# Patient Record
Sex: FEMALE | Race: WHITE | NOT HISPANIC OR LATINO | Employment: UNEMPLOYED | URBAN - METROPOLITAN AREA
[De-identification: names, ages, dates, MRNs, and addresses within clinical notes are randomized per-mention and may not be internally consistent; named-entity substitution may affect disease eponyms.]

---

## 2021-02-25 ENCOUNTER — IMMUNIZATION (OUTPATIENT)
Dept: VACCINE CLINIC | Facility: HOSPITAL | Age: 26
End: 2021-02-25

## 2021-02-25 PROCEDURE — 0011A: CPT | Performed by: INTERNAL MEDICINE

## 2021-02-25 PROCEDURE — 91301 HC SARSCO02 VAC 100MCG/0.5ML IM: CPT | Performed by: INTERNAL MEDICINE

## 2021-03-25 ENCOUNTER — IMMUNIZATION (OUTPATIENT)
Dept: VACCINE CLINIC | Facility: HOSPITAL | Age: 26
End: 2021-03-25

## 2021-03-25 PROCEDURE — 91301 HC SARSCO02 VAC 100MCG/0.5ML IM: CPT | Performed by: INTERNAL MEDICINE

## 2021-03-25 PROCEDURE — 0012A: CPT | Performed by: INTERNAL MEDICINE

## 2022-01-31 ENCOUNTER — TELEPHONE (OUTPATIENT)
Dept: OBSTETRICS AND GYNECOLOGY | Facility: CLINIC | Age: 27
End: 2022-01-31

## 2022-01-31 NOTE — TELEPHONE ENCOUNTER
PT called to schedule a New OB APPT, LMP 12/24, APPT scheduled for 2/22. PT was wondering if she would / should come in sooner, pt has been experiencing cramping. Please advise.

## 2022-01-31 NOTE — TELEPHONE ENCOUNTER
S/w pt she states her NOB appt is on 2/22 with Dr. Landin and complains of having cramping that worsens at night and lasts about 5-10 at a time. Patient denies vaginal bleeding, severe pelvic pain/cramping. Patient states she has had recent intercourse, so I advised patient to pelvic rest, increase water intake, monitor s/s and if her s/s continue or worsen or if she starts to have vaginal bleeding to CB for further evaluaton or go to ED. She v/u

## 2022-02-10 ENCOUNTER — TELEPHONE (OUTPATIENT)
Dept: OBSTETRICS AND GYNECOLOGY | Facility: CLINIC | Age: 27
End: 2022-02-10

## 2022-02-10 DIAGNOSIS — Z32.00 POSSIBLE PREGNANCY, NOT CONFIRMED: Primary | ICD-10-CM

## 2022-02-10 NOTE — TELEPHONE ENCOUNTER
I spoke with pt and she states she has a + pregnancy test and is scheduled 02/22/2022 with Dr. Landin. She reports she has only had mild pregnancy symptoms like frequent urination and mild cramping soon after she found out she was pregnant. She states she is just concerned because now she is not having any symptoms. I told her she is welcome to come in and have an HCG drawn to see how far along she is and a repeat to make sure it is rising appropriately to make sure baby is okay. She vu

## 2022-02-11 ENCOUNTER — LAB (OUTPATIENT)
Dept: OBSTETRICS AND GYNECOLOGY | Facility: CLINIC | Age: 27
End: 2022-02-11

## 2022-02-11 LAB — HCG INTACT+B SERPL-ACNC: NORMAL MIU/ML

## 2022-02-14 ENCOUNTER — TELEPHONE (OUTPATIENT)
Dept: OBSTETRICS AND GYNECOLOGY | Facility: CLINIC | Age: 27
End: 2022-02-14

## 2022-02-14 ENCOUNTER — LAB (OUTPATIENT)
Dept: OBSTETRICS AND GYNECOLOGY | Facility: CLINIC | Age: 27
End: 2022-02-14

## 2022-02-14 DIAGNOSIS — Z32.00 POSSIBLE PREGNANCY, NOT CONFIRMED: Primary | ICD-10-CM

## 2022-02-14 NOTE — TELEPHONE ENCOUNTER
S/w pt she states she was advised to come in today for repeat HCG lab to see if her levels have increased.     HCG lab order has been placed, pt. States she is coming in today for labs.

## 2022-02-15 LAB — HCG INTACT+B SERPL-ACNC: NORMAL MIU/ML

## 2022-02-22 ENCOUNTER — INITIAL PRENATAL (OUTPATIENT)
Dept: OBSTETRICS AND GYNECOLOGY | Facility: CLINIC | Age: 27
End: 2022-02-22

## 2022-02-22 VITALS — SYSTOLIC BLOOD PRESSURE: 116 MMHG | WEIGHT: 123 LBS | DIASTOLIC BLOOD PRESSURE: 76 MMHG

## 2022-02-22 DIAGNOSIS — O20.0 THREATENED ABORTION: ICD-10-CM

## 2022-02-22 DIAGNOSIS — O41.8X10 SUBCHORIONIC HEMORRHAGE OF PLACENTA IN FIRST TRIMESTER, SINGLE OR UNSPECIFIED FETUS: ICD-10-CM

## 2022-02-22 DIAGNOSIS — O46.8X1 SUBCHORIONIC HEMORRHAGE OF PLACENTA IN FIRST TRIMESTER, SINGLE OR UNSPECIFIED FETUS: ICD-10-CM

## 2022-02-22 DIAGNOSIS — Z3A.01 LESS THAN 8 WEEKS GESTATION OF PREGNANCY: Primary | ICD-10-CM

## 2022-02-22 DIAGNOSIS — Z11.3 SCREENING EXAMINATION FOR STD (SEXUALLY TRANSMITTED DISEASE): ICD-10-CM

## 2022-02-22 PROCEDURE — 99204 OFFICE O/P NEW MOD 45 MIN: CPT | Performed by: OBSTETRICS & GYNECOLOGY

## 2022-02-22 NOTE — PROGRESS NOTES
Initial ob visit     CC- Here for care of pregnancy        Nika Lin is a 26 y.o. female, , who presents for her first obstetrical visit.  Her last LMP was Patient's last menstrual period was 2021..    # 1 - Date: None, Sex: None, Weight: None, GA: None, Delivery: None, Apgar1: None, Apgar5: None, Living: None, Birth Comments: None      Current obstetric complaints : none  Initial positive test date : 22     Location : home UPT    Prior obstetric issues, None  Potential pregnancy concerns: none  Family history of genetic issues (includes FOB):yes - connective tissue disease  Prior infections concerning in pregnancy (Rash, fever in last 2 weeks): no  Varicella Hx -Yes  Prior testing for Cystic Fibrosis Carrier or Sickle Cell Trait- No  Prepregnancy BMI - There is no height or weight on file to calculate BMI.  Hx of HSV for patient or partner : no    Additional Pertinent History   Last Pap :   Last Completed Pap Smear          PAP SMEAR (Every 3 Years) Next due on 2020  Done              History of abnormal Pap smear: no  Family history of uterine, colon, breast, or ovarian cancer: Breast CA-MGGM  Performs monthly Self-Breast Exam: yes  Exercises Regularly: yes  Feelings of Anxiety or Depression: no  Tobacco Usage?: No     The additional following portions of the patient's history were reviewed and updated as appropriate: allergies, current medications, past family history, past medical history, past social history, past surgical history and problem list.    Review of Systems   Review of Systems  Constitutional : Nausea, fatigue -no    : Vaginal bleeding, cramping no  Breast Tenderness : yes  All systems reviewed and updated    /76   Wt 55.8 kg (123 lb)   LMP 2021     Physical Exam  General Appearance:    Alert, cooperative, in no acute distress   Head:    Normocephalic, without obvious abnormality, atraumatic   Eyes:            Lids and lashes normal,  conjunctivae and sclerae normal, no icterus, no pallor, corneas clear   Ears:    Ears appear intact with no abnormalities noted       Neck:   No adenopathy, supple, trachea midline, no thyromegaly   Back:     No kyphosis present, no scoliosis present,                       Extremities:   Moves all extremities well, no edema, no cyanosis   Skin:   No bleeding, bruising or rash   Lymph nodes:   No palpable adenopathy   Neurologic:   Sensation intact, A&O times 3        Assessment/Plan   Assessment     Problem List Items Addressed This Visit     None      Visit Diagnoses     Less than 8 weeks gestation of pregnancy    -  Primary    Relevant Orders    Obstetric Panel    Chlamydia trachomatis, Neisseria gonorrhoeae, PCR w/ confirmation - Urine, Urine, Clean Catch    HIV-1 / O / 2 Ag / Antibody 4th Generation    Urinalysis With Microscopic - Urine, Clean Catch    Urine Culture - Urine, Urine, Clean Catch    Urine Drug Screen - Urine, Clean Catch    Screening examination for STD (sexually transmitted disease)        Relevant Orders    Chlamydia trachomatis, Neisseria gonorrhoeae, PCR w/ confirmation - Urine, Urine, Clean Catch          1. Pregnancy at 6w3d  2. Size less than dates  3. Known MVP, connective tissue disorder - followed by cardiology    Plan     1. Reviewed routine prenatal care with the office and educational materials given  2. Counseled on genetic testing options including CF DNA, carrier testing including CF, SMA, and Fragile X,  Tetrascreen,  NT screening and NIPTS/Materniti 21.  3. Follow up: 2 weeks with u/s due to dating discrepency  4. Small FCO on u/s today      Dahiana Landin MD  02/22/2022

## 2022-02-24 LAB
ABO GROUP BLD: NORMAL
AMPHETAMINES UR QL SCN: NEGATIVE NG/ML
APPEARANCE UR: CLEAR
BACTERIA #/AREA URNS HPF: ABNORMAL /[HPF]
BACTERIA UR CULT: NORMAL
BACTERIA UR CULT: NORMAL
BARBITURATES UR QL SCN: NEGATIVE NG/ML
BASOPHILS # BLD AUTO: 0 X10E3/UL (ref 0–0.2)
BASOPHILS NFR BLD AUTO: 0 %
BENZODIAZ UR QL SCN: NEGATIVE NG/ML
BILIRUB UR QL STRIP: NEGATIVE
BLD GP AB SCN SERPL QL: NEGATIVE
BZE UR QL SCN: NEGATIVE NG/ML
C TRACH RRNA SPEC QL NAA+PROBE: NEGATIVE
CANNABINOIDS UR QL SCN: NEGATIVE NG/ML
CASTS URNS QL MICRO: ABNORMAL /LPF
COLOR UR: YELLOW
CREAT UR-MCNC: 117.1 MG/DL (ref 20–300)
CRYSTALS URNS MICRO: ABNORMAL
EOSINOPHIL # BLD AUTO: 0.1 X10E3/UL (ref 0–0.4)
EOSINOPHIL NFR BLD AUTO: 1 %
EPI CELLS #/AREA URNS HPF: ABNORMAL /HPF (ref 0–10)
ERYTHROCYTE [DISTWIDTH] IN BLOOD BY AUTOMATED COUNT: 12.9 % (ref 11.7–15.4)
GLUCOSE UR QL STRIP: NEGATIVE
HBV SURFACE AG SERPL QL IA: NEGATIVE
HCT VFR BLD AUTO: 43.1 % (ref 34–46.6)
HCV AB S/CO SERPL IA: <0.1 S/CO RATIO (ref 0–0.9)
HGB BLD-MCNC: 14.3 G/DL (ref 11.1–15.9)
HGB UR QL STRIP: NEGATIVE
HIV 1+2 AB+HIV1 P24 AG SERPL QL IA: NON REACTIVE
IMM GRANULOCYTES # BLD AUTO: 0 X10E3/UL (ref 0–0.1)
IMM GRANULOCYTES NFR BLD AUTO: 1 %
KETONES UR QL STRIP: NEGATIVE
LABORATORY COMMENT REPORT: NORMAL
LEUKOCYTE ESTERASE UR QL STRIP: NEGATIVE
LYMPHOCYTES # BLD AUTO: 1.8 X10E3/UL (ref 0.7–3.1)
LYMPHOCYTES NFR BLD AUTO: 24 %
MCH RBC QN AUTO: 29.8 PG (ref 26.6–33)
MCHC RBC AUTO-ENTMCNC: 33.2 G/DL (ref 31.5–35.7)
MCV RBC AUTO: 90 FL (ref 79–97)
METHADONE UR QL SCN: NEGATIVE NG/ML
MICRO URNS: NORMAL
MICRO URNS: NORMAL
MONOCYTES # BLD AUTO: 0.4 X10E3/UL (ref 0.1–0.9)
MONOCYTES NFR BLD AUTO: 5 %
N GONORRHOEA RRNA SPEC QL NAA+PROBE: NEGATIVE
NEUTROPHILS # BLD AUTO: 5 X10E3/UL (ref 1.4–7)
NEUTROPHILS NFR BLD AUTO: 69 %
NITRITE UR QL STRIP: NEGATIVE
OPIATES UR QL SCN: NEGATIVE NG/ML
OXYCODONE+OXYMORPHONE UR QL SCN: NEGATIVE NG/ML
PCP UR QL: NEGATIVE NG/ML
PH UR STRIP: 6 [PH] (ref 5–7.5)
PH UR: 6.1 [PH] (ref 4.5–8.9)
PLATELET # BLD AUTO: 215 X10E3/UL (ref 150–450)
PROPOXYPH UR QL SCN: NEGATIVE NG/ML
PROT UR QL STRIP: NEGATIVE
RBC # BLD AUTO: 4.8 X10E6/UL (ref 3.77–5.28)
RBC #/AREA URNS HPF: ABNORMAL /HPF (ref 0–2)
RH BLD: NEGATIVE
RPR SER QL: NON REACTIVE
RUBV IGG SERPL IA-ACNC: 11.7 INDEX
SP GR UR STRIP: 1.02 (ref 1–1.03)
UNIDENT CRYS URNS QL MICRO: PRESENT
UROBILINOGEN UR STRIP-MCNC: 0.2 MG/DL (ref 0.2–1)
WBC # BLD AUTO: 7.3 X10E3/UL (ref 3.4–10.8)
WBC #/AREA URNS HPF: ABNORMAL /HPF (ref 0–5)

## 2022-03-15 ENCOUNTER — ROUTINE PRENATAL (OUTPATIENT)
Dept: OBSTETRICS AND GYNECOLOGY | Facility: CLINIC | Age: 27
End: 2022-03-15

## 2022-03-15 VITALS
SYSTOLIC BLOOD PRESSURE: 122 MMHG | WEIGHT: 125 LBS | DIASTOLIC BLOOD PRESSURE: 70 MMHG | BODY MASS INDEX: 18.94 KG/M2 | HEIGHT: 68 IN

## 2022-03-15 DIAGNOSIS — Z3A.09 9 WEEKS GESTATION OF PREGNANCY: Primary | ICD-10-CM

## 2022-03-15 LAB
EXPIRATION DATE: 0
GLUCOSE UR STRIP-MCNC: NEGATIVE MG/DL
Lab: 0
PROT UR STRIP-MCNC: NEGATIVE MG/DL

## 2022-03-15 PROCEDURE — 99214 OFFICE O/P EST MOD 30 MIN: CPT | Performed by: OBSTETRICS & GYNECOLOGY

## 2022-03-15 NOTE — PROGRESS NOTES
"OB FOLLOW UP    Nika Lin is a 26 y.o.  9w3d patient being seen today for her obstetrical follow up visit. Patient reports fatigue. U/S today for dating.     Her prenatal care is complicated by (and status) : H/O small subchorionic hemorrhage on previous U/S    The additional following portions of the patient's history were reviewed and updated as appropriate: allergies, current medications, past family history, past medical history, past social history, past surgical history and problem list.      ROS -   Denies: Loss of Fluid, Vaginal Spotting, Vision Changes and Headaches   Vaginal bleeding:no   Cramping/Contractions: no     /70   Ht 172.7 cm (68\")   Wt 56.7 kg (125 lb)   LMP 2021   BMI 19.01 kg/m²     FHT:  present   Pelvic Exam: Performed: No     Assessment    1. Pregnancy at 9w3d  2. Fetal status reassuring   3. Counseled on genetic testing, carrier status and option for NT screen      Problem List Items Addressed This Visit    None     Visit Diagnoses     9 weeks gestation of pregnancy    -  Primary    Relevant Orders    POC Protein, Urine, Qualitative, Dipstick (Completed)    POC Glucose, Urine, Qualitative, Dipstick (Completed)          Plan    1. Reviewed routine prenatal care with the office and educational materials given  2. Follow up: 4 weeks or prn problems    Dahiana Landin MD  03/15/2022  "

## 2022-03-16 ENCOUNTER — TELEPHONE (OUTPATIENT)
Dept: OBSTETRICS AND GYNECOLOGY | Facility: CLINIC | Age: 27
End: 2022-03-16

## 2022-03-16 NOTE — TELEPHONE ENCOUNTER
Patient called and stated that she is RH- but her  is RH+ and would like speak with nurse to see if she does/does not needs any shots, etc.

## 2022-03-16 NOTE — TELEPHONE ENCOUNTER
Answered patient questions regarding rhogam injection and circumstances it's given. Instructed patient to call if any bleeding prior to 28 weeks. Verbalized understanding.

## 2022-03-20 LAB
5P15 DELETION (CRI-DU-CHAT): NOT DETECTED
CFDNA.FET/CFDNA.TOTAL SFR FETUS: NORMAL %
CITATION REF LAB TEST: NORMAL
FET 13+18+21+X+Y ANEUP PLAS.CFDNA: NEGATIVE
FET 1P36 DEL RISK WBC.DNA+CFDNA QL: NOT DETECTED
FET 22Q11.2 DEL RISK WBC.DNA+CFDNA QL: NOT DETECTED
FET CHR 11Q23 DEL PLAS.CFDNA QL: NOT DETECTED
FET CHR 15Q11 DEL PLAS.CFDNA QL: NOT DETECTED
FET CHR 21 TS PLAS.CFDNA QL: NEGATIVE
FET CHR 4P16 DEL PLAS.CFDNA QL: NOT DETECTED
FET CHR 8Q24 DEL PLAS.CFDNA QL: NOT DETECTED
FET MS X RISK WBC.DNA+CFDNA QL: NOT DETECTED
FET SEX PLAS.CFDNA DOSAGE CFDNA: NORMAL
FET TS 13 RISK PLAS.CFDNA QL: NEGATIVE
FET TS 18 RISK WBC.DNA+CFDNA QL: NEGATIVE
FET X + Y ANEUP RISK PLAS.CFDNA SEQ-IMP: NOT DETECTED
GA EST FROM CONCEPTION DATE: NORMAL D
GESTATIONAL AGE > 9:: NORMAL
LAB DIRECTOR NAME PROVIDER: NORMAL
LAB DIRECTOR NAME PROVIDER: NORMAL
LABORATORY COMMENT REPORT: NORMAL
LIMITATIONS OF THE TEST: NORMAL
NEGATIVE PREDICTIVE VALUE: NORMAL
NOTE: NORMAL
PERFORMANCE CHARACTERISTICS: NORMAL
POSITIVE PREDICTIVE VALUE: NORMAL
REF LAB TEST METHOD: NORMAL
TEST PERFORMANCE INFO SPEC: NORMAL
TRIOSOMY 16: NOT DETECTED
TRISOMY 22: NOT DETECTED

## 2022-03-25 ENCOUNTER — TRANSCRIBE ORDERS (OUTPATIENT)
Dept: PHYSICAL THERAPY | Facility: CLINIC | Age: 27
End: 2022-03-25

## 2022-03-25 DIAGNOSIS — M51.26 LUMBAGO DUE TO DISPLACEMENT OF INTERVERTEBRAL DISC: Primary | ICD-10-CM

## 2022-03-25 DIAGNOSIS — Z3A.11 11 WEEKS GESTATION OF PREGNANCY: ICD-10-CM

## 2022-03-29 ENCOUNTER — TREATMENT (OUTPATIENT)
Dept: PHYSICAL THERAPY | Facility: CLINIC | Age: 27
End: 2022-03-29

## 2022-03-29 DIAGNOSIS — Z3A.11 11 WEEKS GESTATION OF PREGNANCY: ICD-10-CM

## 2022-03-29 DIAGNOSIS — M43.16 ANTEROLISTHESIS OF LUMBAR SPINE: ICD-10-CM

## 2022-03-29 DIAGNOSIS — M54.50 CHRONIC BILATERAL LOW BACK PAIN WITHOUT SCIATICA: Primary | ICD-10-CM

## 2022-03-29 DIAGNOSIS — G89.29 CHRONIC BILATERAL LOW BACK PAIN WITHOUT SCIATICA: Primary | ICD-10-CM

## 2022-03-29 PROCEDURE — 97530 THERAPEUTIC ACTIVITIES: CPT | Performed by: PHYSICAL THERAPIST

## 2022-03-29 PROCEDURE — 97110 THERAPEUTIC EXERCISES: CPT | Performed by: PHYSICAL THERAPIST

## 2022-03-29 PROCEDURE — 97162 PT EVAL MOD COMPLEX 30 MIN: CPT | Performed by: PHYSICAL THERAPIST

## 2022-03-29 NOTE — PROGRESS NOTES
Physical Therapy Initial Evaluation and Plan of Care      Patient: Nika Lin   : 1995  Diagnosis/ICD-10 Code:  Chronic bilateral low back pain without sciatica [M54.50, G89.29]  Referring practitioner: Kenna Rizo PA-C    Subjective Evaluation    History of Present Illness  Mechanism of injury: Had compression fracture at 20 years old at L2 from thrown from horse. Would get pain intermittently. Past year got worse. Has shooting pain with extending backwards. Getting worse when she walks. When bringing right foot forward, shooting pain on right SI joint. No symptoms down leg.     Rads show degenerating disc at L5-S1 and anterolisthesis.     Currently 11 weeks pregnant.     PMH includes: mitral valve prolapse, connective tissue disorder.         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    Patient Goals  Patient goals for therapy: decreased edema, decreased pain, return to sport/leisure activities, independence with ADLs/IADLs, increased strength and increased motion             Objective          Palpation     Right Tenderness of the quadratus lumborum.     Tenderness     Lumbar Spine  Tenderness in the spinous process and facet joint.     Left Hip   No tenderness in the PSIS.     Right Hip   No tenderness in the PSIS.     Additional Tenderness Details  L2 s.p. raised posteriorly. Right facet joint tenderness >L     Active Range of Motion     Lumbar   Flexion: Active lumbar flexion: able to reach hands to groung using hip flexion, lumbar flexion limited approximately 25%   Extension: 10 degrees with pain  Left lateral flexion: WFL  Right lateral flexion: WFL and with pain    Additional  Active Range of Motion Details  Grossly hypermobile at hip joints, R>L. Snapping hip on R     Joint Play   Left Hip   Hypermobile in the posterior hip capsule.     Right Hip     Hypermobile in the posterior hip capsule and anterior hip capsule    Strength/Myotome Testing     Left Hip   Planes of Motion   Flexion: 4  Extension: 4+  Abduction: 4+    Right Hip   Planes of Motion   Flexion: 4- (instability at left quadratus )  Extension: 4+  Abduction: 4+    Left Knee   Flexion: 5  Extension: 5    Right Knee   Flexion: 5  Extension: 5    Muscle Activation   Patient unable to activate left transverse abdominals and right transverse abdominals.     Additional Muscle Activation Details  Activating TAs reduces back pain    Tests     Lumbar     Right   Positive passive lumbar instability and vertical compression.   Negative crossed SLR and passive SLR.     Right Pelvic Girdle/Sacrum   Negative: sacrum compression and gapping.           Assessment & Plan     Assessment  Impairments: abnormal coordination, abnormal gait, abnormal muscle firing, abnormal muscle tone, abnormal or restricted ROM, activity intolerance, impaired balance, impaired physical strength, lacks appropriate home exercise program and pain with function  Functional Limitations: lifting, walking, pulling, pushing, uncomfortable because of pain, moving in bed and standing  Assessment details: Patient is a 26 year old female presenting with chronic intermittent low back pain that does not refer to LEs. Contributing history includes L2 compression fracture at 20 years old (did not have rehab following), connective tissue disorder, mitral valve prolapse (limits heavy cardio and weight lifting), and is currently 11 weeks pregnant with first child. Imaging demonstrates L5-S1 anterolisthesis which is likely causing current issues in combination with hyperflexability/instability. No sign of nerve compression at this time. She is appropriate for physical therapy to  address this issue to return to active lifestyle and to prepare for later stages of pregnancy that would like exacerbate all symptoms.     Barriers to therapy: congenital heart defect, history of spine frature, congenital connective tissue disorder, current pregnancy  Prognosis: good  Prognosis details: Short Term Goals (3 weeks):  1. Patient will be independent with home exercise program.  2. Patient will demonstrate improved hip strength by 50%.  3. Patient will demonstrate good control of deep core with dynamic movement.     Long Term Goals (8 weeks):  1. Patient will be able to walk at least 20 minutes without back pain.   2. Patient will be able to extend lumbar spine 15 degrees without increase in back pain.   3. Patient will be able to return to full work/home duty without back pain.       Plan  Therapy options: will be seen for skilled therapy services  Planned modality interventions: thermotherapy (hydrocollator packs), TENS, high voltage pulsed current (spasm management), high voltage pulsed current (pain management) and cryotherapy  Planned therapy interventions: therapeutic activities, stretching, strengthening, spinal/joint mobilization, postural training, soft tissue mobilization, neuromuscular re-education, manual therapy, motor coordination training, abdominal trunk stabilization, ADL retraining, balance/weight-bearing training, joint mobilization, IADL retraining, home exercise program, gait training, functional ROM exercises, flexibility, fine motor coordination training and body mechanics training  Frequency: 2x week  Duration in weeks: 8  Treatment plan discussed with: patient        Access Code: [email or web address]  URL: [email or web address]  Date: 03/29/2022  Prepared by: Khadra Phan    Exercises  Supine Pelvic Tilt with Straight Leg Raise - 1 x daily - 7 x weekly - 3 sets - 10 reps  Clamshell with Resistance - 1 x daily - 7 x weekly - 3 sets - 10 reps  Hooklying SI Joint Self-Correction - 7 x  weekly - 5 reps - 5 hold  Hooklying Isometric Hip Abduction with Belt - 7 x weekly - 5 reps - 5 hold        Manual Therapy:         mins  13574;  Therapeutic Exercise:    15     mins  42474;     Neuromuscular Alexandre:    15    mins  29648;    Therapeutic Activity:          mins  95019;     Gait Training:           mins  93231;     Ultrasound:         mins  23289;    Electrical Stimulation:         mins  46387 ( );  Dry Needling          mins self-pay    Timed Treatment:   30   mins   Total Treatment:     53   mins    PT SIGNATURE: Khadra Phan, SARAY   DATE TREATMENT INITIATED: 3/30/2022    Initial Certification  Certification Period: 6/28/2022  I certify that the therapy services are furnished while this patient is under my care.  The services outlined above are required by this patient, and will be reviewed every 90 days.     PHYSICIAN: Kenna Rizo PA-C      DATE:     Please sign and return via fax to [phone].. Thank you, [facility].

## 2022-04-05 ENCOUNTER — TREATMENT (OUTPATIENT)
Dept: PHYSICAL THERAPY | Facility: CLINIC | Age: 27
End: 2022-04-05

## 2022-04-05 DIAGNOSIS — M43.16 ANTEROLISTHESIS OF LUMBAR SPINE: ICD-10-CM

## 2022-04-05 DIAGNOSIS — M54.50 CHRONIC BILATERAL LOW BACK PAIN WITHOUT SCIATICA: Primary | ICD-10-CM

## 2022-04-05 DIAGNOSIS — G89.29 CHRONIC BILATERAL LOW BACK PAIN WITHOUT SCIATICA: Primary | ICD-10-CM

## 2022-04-05 DIAGNOSIS — Z3A.11 11 WEEKS GESTATION OF PREGNANCY: ICD-10-CM

## 2022-04-05 PROCEDURE — 97112 NEUROMUSCULAR REEDUCATION: CPT | Performed by: PHYSICAL THERAPIST

## 2022-04-05 PROCEDURE — 97110 THERAPEUTIC EXERCISES: CPT | Performed by: PHYSICAL THERAPIST

## 2022-04-05 NOTE — PROGRESS NOTES
Visit #: 2    Subjective   Nika Lin reports: was sweeping water off porch for about 20 minutes and had increased back pain. Rested back that night and it was mostly better the next day, was able to do self MET which also helped ease pain.     Left collar bone dislocates at SC joint.     Objective     Left hip drop and instability with birddog, improved with practice.     Cues for correct posture with unilaterally weighted exercises. Tends to hike one shoulder, lateral lean away from weight, and left hip drop with swing through.     See Exercise, Manual, and Modality Logs for complete treatment.       Assessment/Plan  Patient tolerated exercises well without increase in back pain, will continue core stability, hip strengthening and incorporate upper body stability.   Progress per Plan of Care and Progress strengthening /stabilization /functional activity           Manual Therapy:         mins  24955;  Therapeutic Exercise:    25     mins  64346;     Neuromuscular Alexandre:    35    mins  72843;    Therapeutic Activity:          mins  75477;     Gait Training:           mins  46739;     Ultrasound:          mins  13098;   Iontophoresis         mins  59088   Electrical Stimulation:         mins  89677 ( );  Dry Needling          mins self-pay  Fluidotherapy          mins 21904    Timed Treatment:   60   mins   Total Treatment:     60   mins    Khadra Phan, PT  Physical Therapist

## 2022-04-06 ENCOUNTER — TELEPHONE (OUTPATIENT)
Dept: OBSTETRICS AND GYNECOLOGY | Facility: CLINIC | Age: 27
End: 2022-04-06

## 2022-04-06 NOTE — TELEPHONE ENCOUNTER
Verified patient message. Symptoms started Sunday; flushed system with water. Yesterday, worsened. Frequency in urination with dysuria.    Appointment with CHICO Nair tomorrow at 900.

## 2022-04-07 ENCOUNTER — ROUTINE PRENATAL (OUTPATIENT)
Dept: OBSTETRICS AND GYNECOLOGY | Facility: CLINIC | Age: 27
End: 2022-04-07

## 2022-04-07 VITALS — BODY MASS INDEX: 19.19 KG/M2 | DIASTOLIC BLOOD PRESSURE: 72 MMHG | SYSTOLIC BLOOD PRESSURE: 118 MMHG | WEIGHT: 126.2 LBS

## 2022-04-07 DIAGNOSIS — Z3A.12 12 WEEKS GESTATION OF PREGNANCY: Primary | ICD-10-CM

## 2022-04-07 DIAGNOSIS — R31.9 HEMATURIA, UNSPECIFIED TYPE: ICD-10-CM

## 2022-04-07 DIAGNOSIS — R39.9 UTI SYMPTOMS: ICD-10-CM

## 2022-04-07 LAB
BILIRUB BLD-MCNC: NEGATIVE MG/DL
CLARITY, POC: CLEAR
COLOR UR: YELLOW
GLUCOSE UR STRIP-MCNC: NEGATIVE MG/DL
KETONES UR QL: NEGATIVE
LEUKOCYTE EST, POC: NEGATIVE
NITRITE UR-MCNC: NEGATIVE MG/ML
PH UR: 7 [PH] (ref 5–8)
PROT UR STRIP-MCNC: NEGATIVE MG/DL
RBC # UR STRIP: ABNORMAL /UL
SP GR UR: 1.02 (ref 1–1.03)
UROBILINOGEN UR QL: NORMAL

## 2022-04-07 PROCEDURE — 99214 OFFICE O/P EST MOD 30 MIN: CPT | Performed by: NURSE PRACTITIONER

## 2022-04-07 RX ORDER — NITROFURANTOIN 25; 75 MG/1; MG/1
100 CAPSULE ORAL 2 TIMES DAILY
Qty: 14 CAPSULE | Refills: 0 | Status: SHIPPED | OUTPATIENT
Start: 2022-04-07 | End: 2022-04-14

## 2022-04-07 NOTE — PROGRESS NOTES
OB FOLLOW UP  CC- Here for care of pregnancy        Nika Lin is a 26 y.o.  12w5d patient being seen today for a work in OB visit.  She noticed a trace of pink spotting with wiping yesterday x 1.  She also had urgeny to urinate, painful urination, burning, and frequency.  Symptoms are better today but still present.   She has a hx of UTIs.  She had the same symptoms in 2022 which resolved spontaneously.     She does not think today's symptoms are vaginal; she denies itching, burning, odor, abn dc.    Her prenatal care is complicated by (and status) :  none      Flu Status: Already given in current flu season  Ultrasound Today: No.    ROS -   Patient Reports : Ligament Pain and Spotting  Patient Denies: Loss of Fluid, Vision Changes, Headaches, Nausea , Vomiting  and Contractions  Fetal Movement : Absent  All other systems reviewed and are negative.       The additional following portions of the patient's history were reviewed and updated as appropriate: allergies and current medications.    I have reviewed and agree with the HPI, ROS, and historical information as entered above. Gladys Díazf, APRN    /72   Wt 57.2 kg (126 lb 3.2 oz)   LMP 2021   BMI 19.19 kg/m²       EXAM:     FHT: wnl BPM   Urine glucose/protein: See prenatal flowsheet       Assessment and Plan    Problem List Items Addressed This Visit    None     Visit Diagnoses     12 weeks gestation of pregnancy    -  Primary    Relevant Orders    POC Urinalysis Dipstick (Completed)    UTI symptoms        Relevant Medications    nitrofurantoin, macrocrystal-monohydrate, (Macrobid) 100 MG capsule    Other Relevant Orders    Urine Culture - Urine, Urine, Clean Catch    Hematuria, unspecified type              1. Pregnancy at 12w5d  2. Fetal status reassuring.   3. Activity and Exercise discussed.  Return if symptoms worsen or fail to improve, for Next scheduled follow up.   Only trace blood now but due to hx and symptoms, will  treat.  Culture sent.  Push water, low caffeine.  Call no improvement or new symptoms in the next 3 days.    Gladys Bello, APRN  04/07/2022

## 2022-04-08 ENCOUNTER — TREATMENT (OUTPATIENT)
Dept: PHYSICAL THERAPY | Facility: CLINIC | Age: 27
End: 2022-04-08

## 2022-04-08 DIAGNOSIS — G89.29 CHRONIC BILATERAL LOW BACK PAIN WITHOUT SCIATICA: Primary | ICD-10-CM

## 2022-04-08 DIAGNOSIS — M54.50 CHRONIC BILATERAL LOW BACK PAIN WITHOUT SCIATICA: Primary | ICD-10-CM

## 2022-04-08 DIAGNOSIS — M43.16 ANTEROLISTHESIS OF LUMBAR SPINE: ICD-10-CM

## 2022-04-08 DIAGNOSIS — Z3A.11 11 WEEKS GESTATION OF PREGNANCY: ICD-10-CM

## 2022-04-08 PROCEDURE — 97112 NEUROMUSCULAR REEDUCATION: CPT | Performed by: PHYSICAL THERAPIST

## 2022-04-08 PROCEDURE — 97110 THERAPEUTIC EXERCISES: CPT | Performed by: PHYSICAL THERAPIST

## 2022-04-08 NOTE — PROGRESS NOTES
Visit #: 3    Subjective   Nika Lin reports: has had no back pain. Is congested in head.     Objective   Lumbar extension: pain at about 20 deg extension.   R lateral flexion: full and pain free.     See Exercise, Manual, and Modality Logs for complete treatment.       Assessment/Plan  Patient is progressing very well with stability and hip strength, allowing further lumbar movements in pain free ranges. Will continue into next week. Will likely give extensive HEP that can be modified as her pregnancy progresses.   Progress per Plan of Care           Manual Therapy:         mins  99577;  Therapeutic Exercise:    28     mins  45230;     Neuromuscular Alexandre:    32    mins  89925;    Therapeutic Activity:          mins  39211;     Gait Training:           mins  29468;     Ultrasound:          mins  40511;   Iontophoresis          mins  07961   Electrical Stimulation:         mins  53729 ( );  Dry Needling         mins self-pay  Fluidotherapy          mins 63176    Timed Treatment:   60   mins   Total Treatment:     60   mins    Khadra Phan, PT  Physical Therapist

## 2022-04-09 LAB
BACTERIA UR CULT: NORMAL
BACTERIA UR CULT: NORMAL

## 2022-04-12 ENCOUNTER — TREATMENT (OUTPATIENT)
Dept: PHYSICAL THERAPY | Facility: CLINIC | Age: 27
End: 2022-04-12

## 2022-04-12 PROCEDURE — 97110 THERAPEUTIC EXERCISES: CPT | Performed by: PHYSICAL THERAPIST

## 2022-04-12 PROCEDURE — 97112 NEUROMUSCULAR REEDUCATION: CPT | Performed by: PHYSICAL THERAPIST

## 2022-04-14 ENCOUNTER — ROUTINE PRENATAL (OUTPATIENT)
Dept: OBSTETRICS AND GYNECOLOGY | Facility: CLINIC | Age: 27
End: 2022-04-14

## 2022-04-14 VITALS — WEIGHT: 127 LBS | SYSTOLIC BLOOD PRESSURE: 102 MMHG | BODY MASS INDEX: 19.31 KG/M2 | DIASTOLIC BLOOD PRESSURE: 70 MMHG

## 2022-04-14 DIAGNOSIS — Z3A.13 13 WEEKS GESTATION OF PREGNANCY: Primary | ICD-10-CM

## 2022-04-14 LAB
GLUCOSE UR STRIP-MCNC: NEGATIVE MG/DL
PROT UR STRIP-MCNC: NEGATIVE MG/DL

## 2022-04-14 PROCEDURE — 99212 OFFICE O/P EST SF 10 MIN: CPT | Performed by: OBSTETRICS & GYNECOLOGY

## 2022-04-14 NOTE — PROGRESS NOTES
OB FOLLOW UP    Nika Lin is a 26 y.o.  13w5d patient being seen today for her obstetrical follow up visit. Patient reports no complaints.     Her prenatal care is complicated by (and status) : None    The additional following portions of the patient's history were reviewed and updated as appropriate: allergies, current medications, past family history, past medical history, past social history, past surgical history and problem list.      ROS -   none   Vaginal bleeding none  Cramping/Contractions none     /70   Wt 57.6 kg (127 lb)   LMP 2021   BMI 19.31 kg/m²     FHT:  present   Pelvic Exam: Performed: No     Assessment    1. Pregnancy at 13w5d  2. Fetal status reassuring   3. Counseled on genetic testing, carrier status and option for NT screen. Already done and negative      Problem List Items Addressed This Visit    None     Visit Diagnoses     13 weeks gestation of pregnancy    -  Primary    Relevant Orders    POC Urinalysis Dipstick (Completed)          Plan    1. Reviewed routine prenatal care with the office and educational materials given  2. Follow up: 4 weeks or prn problems    Dahiana Landin MD  2022

## 2022-04-15 ENCOUNTER — TREATMENT (OUTPATIENT)
Dept: PHYSICAL THERAPY | Facility: CLINIC | Age: 27
End: 2022-04-15

## 2022-04-15 DIAGNOSIS — M54.50 CHRONIC BILATERAL LOW BACK PAIN WITHOUT SCIATICA: Primary | ICD-10-CM

## 2022-04-15 DIAGNOSIS — G89.29 CHRONIC BILATERAL LOW BACK PAIN WITHOUT SCIATICA: Primary | ICD-10-CM

## 2022-04-15 DIAGNOSIS — M43.16 ANTEROLISTHESIS OF LUMBAR SPINE: ICD-10-CM

## 2022-04-15 PROCEDURE — 97112 NEUROMUSCULAR REEDUCATION: CPT | Performed by: PHYSICAL THERAPIST

## 2022-04-15 PROCEDURE — 97110 THERAPEUTIC EXERCISES: CPT | Performed by: PHYSICAL THERAPIST

## 2022-04-15 NOTE — PROGRESS NOTES
Visit #: 5    Subjective   Nika Lin reports: had some left sided sacral/back pain yesterday, unsure why but doesn't typically have pain on that side. Was very sore in hamstrings after last visit.     Objective   Plank: cues for form, able to work up to 30 second    See Exercise, Manual, and Modality Logs for complete treatment.       Assessment/Plan  Will continue next week. Deadlift form and hip stability much improved today.   Progress per Plan of Care           Manual Therapy:         mins  84347;  Therapeutic Exercise:    30     mins  77981;     Neuromuscular Alexandre:    25    mins  18413;    Therapeutic Activity:          mins  96263;     Gait Training:           mins  80182;     Ultrasound:          mins  55780;   Iontophoresis          mins  01341   Electrical Stimulation:        mins  98943 ( );  Dry Needling          mins self-pay  Fluidotherapy          mins 45122    Timed Treatment:   55   mins   Total Treatment:     55   mins    Khadra Phan, PT  Physical Therapist

## 2022-04-19 ENCOUNTER — TELEPHONE (OUTPATIENT)
Dept: PHYSICAL THERAPY | Facility: CLINIC | Age: 27
End: 2022-04-19

## 2022-04-22 ENCOUNTER — TREATMENT (OUTPATIENT)
Dept: PHYSICAL THERAPY | Facility: CLINIC | Age: 27
End: 2022-04-22

## 2022-04-22 DIAGNOSIS — M54.50 CHRONIC BILATERAL LOW BACK PAIN WITHOUT SCIATICA: ICD-10-CM

## 2022-04-22 DIAGNOSIS — G89.29 CHRONIC BILATERAL LOW BACK PAIN WITHOUT SCIATICA: ICD-10-CM

## 2022-04-22 DIAGNOSIS — M43.16 ANTEROLISTHESIS OF LUMBAR SPINE: Primary | ICD-10-CM

## 2022-04-22 PROCEDURE — 97112 NEUROMUSCULAR REEDUCATION: CPT | Performed by: PHYSICAL THERAPIST

## 2022-04-22 PROCEDURE — 97110 THERAPEUTIC EXERCISES: CPT | Performed by: PHYSICAL THERAPIST

## 2022-04-22 NOTE — PROGRESS NOTES
Visit #: 6    Subjective   Nika Lin reports: did a lot of bent over work cleaning out her car for a few hours which did cause back pain and soreness for about 2 days. Was not good at practicing good posture and core stability during this work.     Objective   See Exercise, Manual, and Modality Logs for complete treatment.       Assessment/Plan  Discussed practicing active core stability when needing to be bent over for a prolonged period. Went over extensive HEP that is appropriate throughout pregnancy as well as things to watch out for such as increased pelvic pressure and coning during exercise. Will continue working on stability and hip strength for lower lumbar anterolisthesis with hypermobility disorder complication.   Progress per Plan of Care and Progress strengthening /stabilization /functional activity           Manual Therapy:         mins  99475;  Therapeutic Exercise:    35     mins  17028;     Neuromuscular Alexandre:    23    mins  55283;    Therapeutic Activity:          mins  17770;     Gait Training:           mins  30133;     Ultrasound:          mins  63383;   Iontophoresis         mins  50367   Electrical Stimulation:         mins  55655 ( );  Dry Needling          mins self-pay  Fluidotherapy         mins 02968    Timed Treatment:   58   mins   Total Treatment:     58   mins    Khadra Phan, PT  Physical Therapist

## 2022-04-29 ENCOUNTER — TREATMENT (OUTPATIENT)
Dept: PHYSICAL THERAPY | Facility: CLINIC | Age: 27
End: 2022-04-29

## 2022-04-29 DIAGNOSIS — G89.29 CHRONIC BILATERAL LOW BACK PAIN WITHOUT SCIATICA: ICD-10-CM

## 2022-04-29 DIAGNOSIS — M43.16 ANTEROLISTHESIS OF LUMBAR SPINE: Primary | ICD-10-CM

## 2022-04-29 DIAGNOSIS — M54.50 CHRONIC BILATERAL LOW BACK PAIN WITHOUT SCIATICA: ICD-10-CM

## 2022-04-29 PROCEDURE — 97110 THERAPEUTIC EXERCISES: CPT | Performed by: PHYSICAL THERAPIST

## 2022-04-29 PROCEDURE — 97112 NEUROMUSCULAR REEDUCATION: CPT | Performed by: PHYSICAL THERAPIST

## 2022-04-29 NOTE — PROGRESS NOTES
Visit #: 7    Subjective   Nika Lin reports: right shoulder pain over past two days. Has not been having back pain.     Objective   See Exercise, Manual, and Modality Logs for complete treatment.       Assessment/Plan  Continuing to progress core stability and general strength.   Progress strengthening /stabilization /functional activity           Manual Therapy:         mins  91591;  Therapeutic Exercise:    35     mins  54123;     Neuromuscular Alexandre:    25    mins  88173;    Therapeutic Activity:          mins  66908;     Gait Training:           mins  45439;     Ultrasound:          mins  87838;   Iontophoresis         mins  68767   Electrical Stimulation:         mins  86420 ( );  Dry Needling          mins self-pay  Fluidotherapy          mins 87311    Timed Treatment:   60   mins   Total Treatment:     60   mins    Khadra Phan PT  Physical Therapist

## 2022-05-10 ENCOUNTER — ROUTINE PRENATAL (OUTPATIENT)
Dept: OBSTETRICS AND GYNECOLOGY | Facility: CLINIC | Age: 27
End: 2022-05-10

## 2022-05-10 VITALS — WEIGHT: 131.8 LBS | BODY MASS INDEX: 20.04 KG/M2 | DIASTOLIC BLOOD PRESSURE: 68 MMHG | SYSTOLIC BLOOD PRESSURE: 110 MMHG

## 2022-05-10 DIAGNOSIS — Z3A.17 17 WEEKS GESTATION OF PREGNANCY: Primary | ICD-10-CM

## 2022-05-10 DIAGNOSIS — Z36.89 ENCOUNTER FOR FETAL ANATOMIC SURVEY: ICD-10-CM

## 2022-05-10 LAB
EXPIRATION DATE: 0
GLUCOSE UR STRIP-MCNC: NEGATIVE MG/DL
Lab: 0
PROT UR STRIP-MCNC: NEGATIVE MG/DL

## 2022-05-10 PROCEDURE — 0502F SUBSEQUENT PRENATAL CARE: CPT | Performed by: OBSTETRICS & GYNECOLOGY

## 2022-05-10 NOTE — PROGRESS NOTES
OB FOLLOW UP     Nika Wynn is a 26 y.o.  17w3d patient being seen today for her obstetrical follow up visit. Patient reports no complaints.     Her prenatal care is complicated by (and status) : None    The additional following portions of the patient's history were reviewed and updated as appropriate: allergies and current medications.      ROS -   no complaints   Vaginal bleeding -none    /68   Wt 59.8 kg (131 lb 12.8 oz)   LMP 2021   BMI 20.04 kg/m²     FHT:  present   Pelvic Exam: Performed: No     Assessment    1. Pregnancy at 17w3d  2. Fetal status reassuring   3. Counseled on MSAFP alone in relation to OTD and placental issues.      Problem List Items Addressed This Visit    None     Visit Diagnoses     17 weeks gestation of pregnancy    -  Primary    Relevant Orders    POC Glucose, Urine, Qualitative, Dipstick (Completed)    POC Protein, Urine, Qualitative, Dipstick (Completed)          Plan    1. Anatomy scan next visit.   2. AFP only offered.  3. U/S in 3 weeks  4. Follow up: 4 weeks  5. Call for any problems, bleeding    Dahiana Landin MD  05/10/2022

## 2022-05-27 ENCOUNTER — ROUTINE PRENATAL (OUTPATIENT)
Dept: OBSTETRICS AND GYNECOLOGY | Facility: CLINIC | Age: 27
End: 2022-05-27

## 2022-05-27 VITALS — WEIGHT: 134 LBS | SYSTOLIC BLOOD PRESSURE: 108 MMHG | BODY MASS INDEX: 20.37 KG/M2 | DIASTOLIC BLOOD PRESSURE: 72 MMHG

## 2022-05-27 DIAGNOSIS — I34.1 MITRAL VALVE PROLAPSE: ICD-10-CM

## 2022-05-27 DIAGNOSIS — Z3A.20 20 WEEKS GESTATION OF PREGNANCY: Primary | ICD-10-CM

## 2022-05-27 DIAGNOSIS — Z36.2 ENCOUNTER FOR FOLLOW-UP ULTRASOUND OF FETAL ANATOMY: ICD-10-CM

## 2022-05-27 DIAGNOSIS — I34.0 NONRHEUMATIC MITRAL VALVE REGURGITATION: ICD-10-CM

## 2022-05-27 DIAGNOSIS — N94.10 FEMALE DYSPAREUNIA: ICD-10-CM

## 2022-05-27 DIAGNOSIS — M35.9 CONNECTIVE TISSUE DISORDER: ICD-10-CM

## 2022-05-27 LAB
GLUCOSE UR STRIP-MCNC: NEGATIVE MG/DL
PROT UR STRIP-MCNC: NEGATIVE MG/DL

## 2022-05-27 PROCEDURE — 0502F SUBSEQUENT PRENATAL CARE: CPT | Performed by: OBSTETRICS & GYNECOLOGY

## 2022-05-27 NOTE — PROGRESS NOTES
OB FOLLOW UP    Nika Wynn is a 26 y.o.  19w6d patient being seen today for her obstetrical follow up visit. Patient reports possible BV  and she feel like her blood sugar is dropping early in the mornings.     She is requesting a NuSwab to rule out BV. She said her vagina is irritated and red after IC. She originally thought it could be a UTI but denies urinary urgency, pain with urination, itching, or odor.     Her prenatal care is complicated by (and status) : None      The additional following portions of the patient's history were reviewed and updated as appropriate: allergies, current medications, past family history, past medical history, past social history, past surgical history and problem list.    ROS -   none   Vaginal bleeding none    /72   Wt 60.8 kg (134 lb)   LMP 2021   BMI 20.37 kg/m²     FHT:  See ultrasound   Pelvic Exam: Performed: No     Assessment    1. Pregnancy at 19w6d  2. Fetal status reassuring   3. U/S today with mild right hydronephrosis, echogenic structure within stomach bubble.      Problem List Items Addressed This Visit    None     Visit Diagnoses     20 weeks gestation of pregnancy    -  Primary    Relevant Orders    POC Urinalysis Dipstick (Completed)    Female dyspareunia        Relevant Orders    NuSwab BV & Candida - Swab, Vagina          Plan    1. Todays u/s reviewed and given maternal h/o MVP, Mitral regurg and the fetal u/s findings will send to [facility] for f/u.  2. Possible BV - Nuswab sent  3. Discussed nutritional and postural reasons for feeling dizzy/lightheaded.  4. Follow up: 3 weeks  5. Call for any problems.    Dahiana Landin MD  2022

## 2022-05-29 LAB
A VAGINAE DNA VAG QL NAA+PROBE: NORMAL SCORE
BVAB2 DNA VAG QL NAA+PROBE: NORMAL SCORE
C ALBICANS DNA VAG QL NAA+PROBE: NEGATIVE
C GLABRATA DNA VAG QL NAA+PROBE: NEGATIVE
MEGA1 DNA VAG QL NAA+PROBE: NORMAL SCORE

## 2022-06-14 ENCOUNTER — ROUTINE PRENATAL (OUTPATIENT)
Dept: OBSTETRICS AND GYNECOLOGY | Facility: CLINIC | Age: 27
End: 2022-06-14

## 2022-06-14 ENCOUNTER — OFFICE VISIT (OUTPATIENT)
Dept: OBSTETRICS AND GYNECOLOGY | Facility: HOSPITAL | Age: 27
End: 2022-06-14

## 2022-06-14 ENCOUNTER — HOSPITAL ENCOUNTER (OUTPATIENT)
Dept: WOMENS IMAGING | Facility: HOSPITAL | Age: 27
Discharge: HOME | End: 2022-06-14
Admitting: OBSTETRICS & GYNECOLOGY

## 2022-06-14 VITALS — WEIGHT: 138 LBS | SYSTOLIC BLOOD PRESSURE: 122 MMHG | BODY MASS INDEX: 21.29 KG/M2 | DIASTOLIC BLOOD PRESSURE: 78 MMHG

## 2022-06-14 VITALS
WEIGHT: 138 LBS | BODY MASS INDEX: 20.92 KG/M2 | SYSTOLIC BLOOD PRESSURE: 134 MMHG | HEIGHT: 68 IN | DIASTOLIC BLOOD PRESSURE: 87 MMHG

## 2022-06-14 DIAGNOSIS — I34.0 NONRHEUMATIC MITRAL VALVE REGURGITATION: ICD-10-CM

## 2022-06-14 DIAGNOSIS — M35.9 CONNECTIVE TISSUE DISORDER: ICD-10-CM

## 2022-06-14 DIAGNOSIS — Z36.2 ENCOUNTER FOR FOLLOW-UP ULTRASOUND OF FETAL ANATOMY: ICD-10-CM

## 2022-06-14 DIAGNOSIS — Z3A.22 22 WEEKS GESTATION OF PREGNANCY: Primary | ICD-10-CM

## 2022-06-14 DIAGNOSIS — I34.1 MITRAL VALVE PROLAPSE: ICD-10-CM

## 2022-06-14 DIAGNOSIS — I34.1 MITRAL VALVE PROLAPSE: Primary | ICD-10-CM

## 2022-06-14 LAB
GLUCOSE UR STRIP-MCNC: NEGATIVE MG/DL
PROT UR STRIP-MCNC: NEGATIVE MG/DL

## 2022-06-14 PROCEDURE — 99215 OFFICE O/P EST HI 40 MIN: CPT | Performed by: OBSTETRICS & GYNECOLOGY

## 2022-06-14 PROCEDURE — 93325 DOPPLER ECHO COLOR FLOW MAPG: CPT | Performed by: OBSTETRICS & GYNECOLOGY

## 2022-06-14 PROCEDURE — 93325 DOPPLER ECHO COLOR FLOW MAPG: CPT

## 2022-06-14 PROCEDURE — 0502F SUBSEQUENT PRENATAL CARE: CPT | Performed by: OBSTETRICS & GYNECOLOGY

## 2022-06-14 PROCEDURE — 76825 ECHO EXAM OF FETAL HEART: CPT

## 2022-06-14 PROCEDURE — 76825 ECHO EXAM OF FETAL HEART: CPT | Performed by: OBSTETRICS & GYNECOLOGY

## 2022-06-14 PROCEDURE — 76811 OB US DETAILED SNGL FETUS: CPT

## 2022-06-14 PROCEDURE — 76811 OB US DETAILED SNGL FETUS: CPT | Performed by: OBSTETRICS & GYNECOLOGY

## 2022-06-14 NOTE — PROGRESS NOTES
OB FOLLOW UP    Nika Wynn is a 27 y.o.  22w3d patient being seen today for her obstetrical follow up visit. Patient reports tailbone pain - degenerative Disc issues . She is currently doing at home PT. She did see [facility] today. She will have Aug f/u    Her prenatal care is complicated by (and status) : MVP and mitral regurgitation-sees cardiologist  Possible connective tissue disorder-no specific diagnosis  Pt is full time [employer]  low risk-BO[employer]  O negative    The additional following portions of the patient's history were reviewed and updated as appropriate: allergies, current medications, past family history, past social history, past surgical history and problem list.      ROS -    Denies other than tailbone pain.    /78   Wt 62.6 kg (138 lb)   LMP 2021   BMI 21.29 kg/m²     FHT:  present   Pelvic Exam: Performed: No     Assessment    1. Pregnancy at 22w3d  2. Fetal status reassuring, patient is feeling more regular movement.    Problem List Items Addressed This Visit    None     Visit Diagnoses     22 weeks gestation of pregnancy    -  Primary    Relevant Orders    POC Urinalysis Dipstick (Completed)          Plan    1. Reviewed one hour glucola instructions for next visit.  2. Reviewed PDC scan  3. Follow up: 4 weeks  4. Call for any problems    Dahiana Landin MD  2022

## 2022-06-24 ENCOUNTER — APPOINTMENT (OUTPATIENT)
Dept: WOMENS IMAGING | Facility: HOSPITAL | Age: 27
End: 2022-06-24

## 2022-06-28 ENCOUNTER — ROUTINE PRENATAL (OUTPATIENT)
Dept: OBSTETRICS AND GYNECOLOGY | Facility: CLINIC | Age: 27
End: 2022-06-28

## 2022-06-28 VITALS — SYSTOLIC BLOOD PRESSURE: 102 MMHG | WEIGHT: 141.6 LBS | BODY MASS INDEX: 21.85 KG/M2 | DIASTOLIC BLOOD PRESSURE: 66 MMHG

## 2022-06-28 DIAGNOSIS — Z3A.24 24 WEEKS GESTATION OF PREGNANCY: Primary | ICD-10-CM

## 2022-06-28 LAB
EXPIRATION DATE: 0
GLUCOSE UR STRIP-MCNC: NEGATIVE MG/DL
Lab: 0
PROT UR STRIP-MCNC: NEGATIVE MG/DL

## 2022-06-28 PROCEDURE — 0502F SUBSEQUENT PRENATAL CARE: CPT | Performed by: OBSTETRICS & GYNECOLOGY

## 2022-06-28 NOTE — PROGRESS NOTES
OB FOLLOW UP    Nika Wynn is a 27 y.o.  24w3d patient being seen today for her obstetrical follow up visit. Patient reports fatigue and trace BUE and BLE swelling.     Her prenatal care is complicated by (and status) : Rh negative    The additional following portions of the patient's history were reviewed and updated as appropriate: allergies and current medications.      ROS -   see above   Vaginal bleeding: denies    /66   Wt 64.2 kg (141 lb 9.6 oz)   LMP 2021   BMI 21.85 kg/m²     FHT:  present   Pelvic Exam: Performed: No     Assessment    1. Pregnancy at 24w3d  2. Fetal status reassuring, patient is feeling more regular movement.    Problem List Items Addressed This Visit    None     Visit Diagnoses     24 weeks gestation of pregnancy    -  Primary    Relevant Orders    POC Glucose, Urine, Qualitative, Dipstick (Completed)    POC Protein, Urine, Qualitative, Dipstick (Completed)          Plan    1. Reviewed one hour glucola instructions for next visit.  2. Reviewed CV history and recent visit with Cardiology  3. Reviewed routine prenatal care with the office and educational materials given  4. Follow up: 4 weeks  5. Call for any problems    Dahiana Landin MD  2022

## 2022-07-05 ENCOUNTER — TELEPHONE (OUTPATIENT)
Dept: OBSTETRICS AND GYNECOLOGY | Facility: CLINIC | Age: 27
End: 2022-07-05

## 2022-07-05 NOTE — TELEPHONE ENCOUNTER
Pt states she has started leaking colostrum and having troy huggins. She wanted to know if she could start collecting the colostrum.     I let her know that troy huggins are normal but be mindful if they become a pattern. I also told her that we do not recommend nipple stimulation since she is not full term since this can cause  labor.     She VU

## 2022-07-12 ENCOUNTER — TELEPHONE (OUTPATIENT)
Dept: OBSTETRICS AND GYNECOLOGY | Facility: CLINIC | Age: 27
End: 2022-07-12

## 2022-07-12 DIAGNOSIS — R10.2 PELVIC PAIN IN PREGNANCY: Primary | ICD-10-CM

## 2022-07-12 DIAGNOSIS — O26.899 PELVIC PAIN IN PREGNANCY: Primary | ICD-10-CM

## 2022-07-18 NOTE — TELEPHONE ENCOUNTER
S/w pt she v/u Dr. Landin recommendations and request we put in order for PT.     I v/u and stated I would go ahead and put that referral in.

## 2022-07-28 ENCOUNTER — ROUTINE PRENATAL (OUTPATIENT)
Dept: OBSTETRICS AND GYNECOLOGY | Facility: CLINIC | Age: 27
End: 2022-07-28

## 2022-07-28 VITALS — BODY MASS INDEX: 22.93 KG/M2 | DIASTOLIC BLOOD PRESSURE: 76 MMHG | SYSTOLIC BLOOD PRESSURE: 124 MMHG | WEIGHT: 148.6 LBS

## 2022-07-28 DIAGNOSIS — Z3A.28 28 WEEKS GESTATION OF PREGNANCY: Primary | ICD-10-CM

## 2022-07-28 LAB
CLARITY, POC: CLEAR
COLOR UR: YELLOW
GLUCOSE UR STRIP-MCNC: NEGATIVE MG/DL
PROT UR STRIP-MCNC: NEGATIVE MG/DL

## 2022-07-28 PROCEDURE — 0502F SUBSEQUENT PRENATAL CARE: CPT | Performed by: NURSE PRACTITIONER

## 2022-07-28 PROCEDURE — 90715 TDAP VACCINE 7 YRS/> IM: CPT | Performed by: NURSE PRACTITIONER

## 2022-07-28 PROCEDURE — 90471 IMMUNIZATION ADMIN: CPT | Performed by: NURSE PRACTITIONER

## 2022-07-28 PROCEDURE — 96372 THER/PROPH/DIAG INJ SC/IM: CPT | Performed by: NURSE PRACTITIONER

## 2022-07-28 NOTE — PROGRESS NOTES
OB FOLLOW UP  CC- Here for care of pregnancy        Nika Wynn is a 27 y.o.  28w5d patient being seen today for her obstetrical follow up. Patient reports occasional contractions, cramping and tightness. Patient stated that she received her COVID booster last week. Patient stated that she is leaking Colstrum and its clear in color but milky.     Patient undergoing Glucola testing today. She is due for her testing at 3:05 pm.     MBT: O-  Rhogam Given: Yes  TDAP: given today  Breast Pump: prescription given  Flu Status: Already given in current flu season  Ultrasound Today: No.    Her prenatal care is complicated by (and status) :    Patient Active Problem List   Diagnosis   • Mitral valve prolapse   • Mitral regurgitation   • Connective tissue disorder (HCC)         ROS -   Patient Reports : Cramping, Contractions and Tightness  Patient Denies: Loss of Fluid, Vaginal Spotting, Vision Changes, Headaches, Nausea  and Vomiting   Fetal Movement : normal    The additional following portions of the patient's history were reviewed and updated as appropriate: allergies and current medications.    I have reviewed and agree with the HPI, ROS, and historical information as entered above. Katarina Peacock, APRN    /76   Wt 67.4 kg (148 lb 9.6 oz)   LMP 2021   BMI 22.93 kg/m²         EXAM:     FHT: pos BPM   Uterine Size: size equals dates  Pelvic Exam: No       Assessment and Plan    Problem List Items Addressed This Visit    None     Visit Diagnoses     28 weeks gestation of pregnancy    -  Primary    Relevant Orders    Gestational Screen 1 Hr ([facility])    CBC (No Diff)    Antibody Screen    POC Urinalysis Dipstick (Completed)          1. Pregnancy at 28w5d  2. Fetal status reassuring.   3. Activity and Exercise discussed.  Return in about 1 month (around 2022) for To follow [facility] 22.   TDAP and rhogam today  DAVE precautions and kick counts reviewed    Katarina Peacock,  APRN  07/28/2022

## 2022-07-29 LAB
BLD GP AB SCN SERPL QL: NEGATIVE
ERYTHROCYTE [DISTWIDTH] IN BLOOD BY AUTOMATED COUNT: 12.3 % (ref 12.3–15.4)
GLUCOSE 1H P 50 G GLC PO SERPL-MCNC: 151 MG/DL (ref 65–139)
HCT VFR BLD AUTO: 36.9 % (ref 34–46.6)
HGB BLD-MCNC: 12.2 G/DL (ref 12–15.9)
MCH RBC QN AUTO: 30.7 PG (ref 26.6–33)
MCHC RBC AUTO-ENTMCNC: 33.1 G/DL (ref 31.5–35.7)
MCV RBC AUTO: 92.7 FL (ref 79–97)
PLATELET # BLD AUTO: 239 10*3/MM3 (ref 140–450)
RBC # BLD AUTO: 3.98 10*6/MM3 (ref 3.77–5.28)
WBC # BLD AUTO: 9.62 10*3/MM3 (ref 3.4–10.8)

## 2022-08-02 ENCOUNTER — TELEPHONE (OUTPATIENT)
Dept: OBSTETRICS AND GYNECOLOGY | Facility: CLINIC | Age: 27
End: 2022-08-02

## 2022-08-02 NOTE — TELEPHONE ENCOUNTER
Pt gave VU of her results. I let her know that she needs to complete fasting 3 hour gtt and she VU and states she will try to come 8/10   (4) Less than 3 years old

## 2022-08-10 ENCOUNTER — LAB (OUTPATIENT)
Dept: OBSTETRICS AND GYNECOLOGY | Facility: CLINIC | Age: 27
End: 2022-08-10

## 2022-08-10 DIAGNOSIS — Z34.90 PREGNANCY, UNSPECIFIED GESTATIONAL AGE: Primary | ICD-10-CM

## 2022-08-10 DIAGNOSIS — O99.810 ABNORMAL MATERNAL GLUCOSE TOLERANCE, ANTEPARTUM: ICD-10-CM

## 2022-08-11 LAB
GLUCOSE 1H P 100 G GLC PO SERPL-MCNC: 212 MG/DL (ref 65–179)
GLUCOSE 2H P 100 G GLC PO SERPL-MCNC: 137 MG/DL (ref 65–154)
GLUCOSE 3H P 100 G GLC PO SERPL-MCNC: 77 MG/DL (ref 65–139)
GLUCOSE P FAST SERPL-MCNC: 51 MG/DL (ref 65–94)

## 2022-08-16 ENCOUNTER — TELEPHONE (OUTPATIENT)
Dept: OBSTETRICS AND GYNECOLOGY | Facility: CLINIC | Age: 27
End: 2022-08-16

## 2022-08-16 NOTE — TELEPHONE ENCOUNTER
Pt thinks she was having troy huggins last night. Thinks there were about 11-12 in an hour. She just wanted to make sure this was normal.

## 2022-08-16 NOTE — TELEPHONE ENCOUNTER
Dr. Landin OB pt.   31w3d    S/w pt she states yesterday she was having consistent contractions through out the day, that were not painful and is not sure how long they were lasting.     Patient states today she has not had them as much.     Patient denies: vaginal bleeding, LOF, HA's or vision changes.    I advised patient to monitor her s/s , lay on her left side, pelvic rest, increase water intake and to CB or go to L&D if she starts to have 4-5 contractions within one hour. She /vu     Baby mvt: 10/10

## 2022-08-23 ENCOUNTER — OFFICE VISIT (OUTPATIENT)
Dept: OBSTETRICS AND GYNECOLOGY | Facility: HOSPITAL | Age: 27
End: 2022-08-23

## 2022-08-23 ENCOUNTER — HOSPITAL ENCOUNTER (OUTPATIENT)
Dept: WOMENS IMAGING | Facility: HOSPITAL | Age: 27
Discharge: HOME | End: 2022-08-23
Admitting: OBSTETRICS & GYNECOLOGY

## 2022-08-23 ENCOUNTER — ROUTINE PRENATAL (OUTPATIENT)
Dept: OBSTETRICS AND GYNECOLOGY | Facility: CLINIC | Age: 27
End: 2022-08-23

## 2022-08-23 VITALS — BODY MASS INDEX: 22.99 KG/M2 | WEIGHT: 149 LBS | SYSTOLIC BLOOD PRESSURE: 130 MMHG | DIASTOLIC BLOOD PRESSURE: 70 MMHG

## 2022-08-23 VITALS — BODY MASS INDEX: 23.18 KG/M2 | DIASTOLIC BLOOD PRESSURE: 82 MMHG | SYSTOLIC BLOOD PRESSURE: 133 MMHG | WEIGHT: 150.2 LBS

## 2022-08-23 DIAGNOSIS — M35.9 CONNECTIVE TISSUE DISORDER: ICD-10-CM

## 2022-08-23 DIAGNOSIS — I34.0 NONRHEUMATIC MITRAL VALVE REGURGITATION: Primary | ICD-10-CM

## 2022-08-23 DIAGNOSIS — Z3A.32 32 WEEKS GESTATION OF PREGNANCY: Primary | ICD-10-CM

## 2022-08-23 DIAGNOSIS — Z34.90 PREGNANCY, UNSPECIFIED GESTATIONAL AGE: ICD-10-CM

## 2022-08-23 DIAGNOSIS — I34.1 MITRAL VALVE PROLAPSE: ICD-10-CM

## 2022-08-23 LAB
GLUCOSE UR STRIP-MCNC: NEGATIVE MG/DL
PROT UR STRIP-MCNC: NEGATIVE MG/DL

## 2022-08-23 PROCEDURE — 0502F SUBSEQUENT PRENATAL CARE: CPT | Performed by: OBSTETRICS & GYNECOLOGY

## 2022-08-23 PROCEDURE — 76816 OB US FOLLOW-UP PER FETUS: CPT

## 2022-08-23 PROCEDURE — 76816 OB US FOLLOW-UP PER FETUS: CPT | Performed by: OBSTETRICS & GYNECOLOGY

## 2022-08-23 NOTE — PROGRESS NOTES
OB FOLLOW UP  CC- Here for care of pregnancy        Nika Wynn is a 27 y.o.  32w3d patient being seen today for her obstetrical follow up visit. Patient reports no complaints..     Her prenatal care is complicated by (and status) :   Patient Active Problem List   Diagnosis   • Mitral valve prolapse   • Mitral regurgitation   • Connective tissue disorder (HCC)   • Pregnancy     TDAP status: received at last visit  Ultrasound Today: Yes with [facility] for Maternal MVP with Regurgitation, Maternal connective tissue disease, Family history of Kabuki Syndrome,       ROS -    Patient Reports : pt reports a lot of BH CTX's over the past two weeks. She is drinking fluids and staying hydrated. Cervical length 3.4cm  Patient Denies: Loss of Fluid and Vaginal Spotting  Fetal Movement : normal  All other systems reviewed and are negative.       The additional following portions of the patient's history were reviewed and updated as appropriate: allergies and current medications.    I have reviewed and agree with the HPI, ROS, and historical information as entered above. Jonathon Butt RN    /70   Wt 67.6 kg (149 lb)   LMP 2021   BMI 22.99 kg/m²       EXAM:     Prenatal Vitals  BP: 130/70  Weight: 67.6 kg (149 lb)             FHT present      Urine Glucose Read-only: Negative  Urine Protein Read-only: Negative       Assessment and Plan    Problem List Items Addressed This Visit        Gravid and     Pregnancy - Primary    Relevant Orders    POC Urinalysis Dipstick (Completed)          1. Pregnancy at 32w3d  2. Fetal status reassuring.  3. 28 week labs reviewed.    4. Activity and Exercise discussed.  5. PDC scan reviewed.  Return in about 2 weeks (around 2022).    Dahiana Landin MD  2022

## 2022-08-23 NOTE — PROGRESS NOTES
Documentation of the ultasound findings, images, and interpretations will be available in the patient's Viewpoint report located in the Chart Review Imaging tab in [employer].

## 2022-09-07 ENCOUNTER — ROUTINE PRENATAL (OUTPATIENT)
Dept: OBSTETRICS AND GYNECOLOGY | Facility: CLINIC | Age: 27
End: 2022-09-07

## 2022-09-07 VITALS — DIASTOLIC BLOOD PRESSURE: 80 MMHG | SYSTOLIC BLOOD PRESSURE: 122 MMHG | BODY MASS INDEX: 23.76 KG/M2 | WEIGHT: 154 LBS

## 2022-09-07 DIAGNOSIS — Z3A.34 34 WEEKS GESTATION OF PREGNANCY: Primary | ICD-10-CM

## 2022-09-07 LAB
GLUCOSE UR STRIP-MCNC: NEGATIVE MG/DL
PROT UR STRIP-MCNC: NEGATIVE MG/DL

## 2022-09-07 PROCEDURE — 0502F SUBSEQUENT PRENATAL CARE: CPT | Performed by: NURSE PRACTITIONER

## 2022-09-07 NOTE — PROGRESS NOTES
OB FOLLOW UP  CC- Here for care of pregnancy        Nika Wynn is a 27 y.o.  34w4d patient being seen today for her obstetrical follow up visit. Patient reports frequent and inconsistent troy huggins, vision changes, and minimal swelling. Patient reports that she had blurriness in left eye over the weekend. She has not had anymore episodes since .  She checked her blood pressure after she started experiencing vision changes and blood pressure was 134/84 and then retook it around ten minutes later and it was 122/84. She states that swelling usually only occurs in lower extremities when she is very active and on her feet for long periods of time. She also reports increased amount of colostrum.     Her prenatal care is complicated by (and status) : None  Patient Active Problem List   Diagnosis   • Mitral valve prolapse   • Mitral regurgitation   • Connective tissue disorder (HCC)   • Pregnancy       Flu Status: Will get at work/pharmacy/other facility   Ultrasound Today: No    ROS -   Patient Reports : BH,  Vision changes, and Swelling   Patient Denies: Loss of Fluid, Vaginal Spotting, Headaches, Contractions and Epigastric pain  Fetal Movement : normal  All other systems reviewed and are negative.       The additional following portions of the patient's history were reviewed and updated as appropriate: allergies, current medications, past family history, past medical history, past social history, past surgical history and problem list.    I have reviewed and agree with the HPI, ROS, and historical information as entered above. Katarina Peacock, APRN    /80   Wt 69.9 kg (154 lb)   LMP 2021   BMI 23.76 kg/m²       EXAM:     Prenatal Vitals  BP: 122/80  Weight: 69.9 kg (154 lb)   Fetal Heart Rate: pos      Fundal Height (cm): 35 cm        Urine Glucose Read-only: Negative  Urine Protein Read-only: Negative       Assessment and Plan    Problem List Items Addressed This Visit         Gravid and     Pregnancy - Primary    Relevant Orders    POC Urinalysis Dipstick (Completed)          1. Pregnancy at 34w4d  2. Fetal status reassuring.   3. Activity and Exercise discussed.  4. Fetal movement/PTL or Labor precautions  5. GBS next visit  Return in about 2 weeks (around 2022) for JACQUELYN Peacock, APRN  2022

## 2022-09-19 ENCOUNTER — TREATMENT (OUTPATIENT)
Dept: PHYSICAL THERAPY | Facility: CLINIC | Age: 27
End: 2022-09-19

## 2022-09-19 DIAGNOSIS — R26.89 DECREASED FUNCTIONAL MOBILITY: Primary | ICD-10-CM

## 2022-09-19 DIAGNOSIS — O26.899 PELVIC PAIN IN PREGNANCY: ICD-10-CM

## 2022-09-19 DIAGNOSIS — R10.2 PELVIC PAIN IN PREGNANCY: ICD-10-CM

## 2022-09-19 DIAGNOSIS — N94.9 PAIN OF FEMALE SYMPHYSIS PUBIS: ICD-10-CM

## 2022-09-19 PROCEDURE — 97110 THERAPEUTIC EXERCISES: CPT | Performed by: PHYSICAL THERAPIST

## 2022-09-19 PROCEDURE — 97162 PT EVAL MOD COMPLEX 30 MIN: CPT | Performed by: PHYSICAL THERAPIST

## 2022-09-19 NOTE — PROGRESS NOTES
Physical Therapy Initial Evaluation and Plan of Care    Patient: Nika Wynn   : 1995  Diagnosis/ICD-10 Code:  Decreased functional mobility [R26.89]  Referring practitioner: Dahiana Landin MD  Date of Initial Visit: 2022  Today's Date: 2022  Patient seen for 1 sessions      Subjective   Started having pain around 20 weeks. Took a while to get in and has gotten better. Comes and goes now where it was constant. 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 Was having increased pain with rolling, standing for longer periods and standing up. Will have more pain if she is not moving around too much. Putting on pants. Having that pain in pubic symphysis. Having boy, SHUN 10/15/22. Increased pain with cleaning the house, vacuuming, sitting in floor upright. HX of DDD around sacrum. Has a history of a connective tissue disorder so already hypermobile. Hx of L5-S1 disc issues.     Chief Complaint:   Chief Complaint   Patient presents with   • Initial Evaluation      Functional Outcome Measure: PGQ: 28%    Pain  Having increased back pain as well as pubic symphysis pain; can be sharp or achey  Average:Pelvic #: 4 Best: Pelvic #: 0 Worst: Pelvic #: 8      Bladder function:  Denies issues other than increased frequency.       Bowel function:  Denies issues  How many BM's/day: 1  [place] Stool Scale Type: 2-4  Discomfort with BM: denies      Sexual activity  Sexually active: no problems other than figuring out mechanics with being pregnant.       Prior PT or other treatment: none    Diagnostics:    PMH:   Past Medical History:   Diagnosis Date   • Compression fracture of L2 vertebra (HCC)    • Connective tissue disorder (HCC)    • Mitral regurgitation    • Mitral valve prolapse         Surgical HX:   Past Surgical History:   Procedure Laterality Date   • NO PAST SURGERIES          Menstrual cycle: n/a    Birth HX:      Meds:   Current Outpatient Medications:   •  Prenatal Vit-Fe Fumarate-FA (PRENATAL  VITAMINS PO), Take  by mouth., Disp: , Rfl:      Occupation: currently [employer]    Activity level/exercise routine: not exercising regularly; active        Objective          Static Posture     Head  Forward.    Shoulders  Rounded.    Lumbar Spine   Increased lordosis.     Palpation     Additional Palpation Details  Decreased activation of B TrA and pelvic floor mm palpated externally in sidelying    Tenderness     Left Hip   Tenderness in the pubic tubercle.     Right Hip   Tenderness in the pubic tubercle.     Additional Tenderness Details  2/10 tenderness of pubic symphysis    Lumbar Screen  Lumbar range of motion within normal limits with the following exceptions:2/10 low back pain with R lateral flexion  Reported round ligament pain with extension    Active Range of Motion   Left Hip   Normal active range of motion    Right Hip   Normal active range of motion    Strength/Myotome Testing     Left Hip   Planes of Motion   Flexion: 4+  Extension: 4  Abduction: 4-  Adduction: 3+    Right Hip   Planes of Motion   Flexion: 4+  Extension: 4  Abduction: 4-  Adduction: 3+    Tests     Left Hip   Negative ROSI, SI compression and SI distraction.   SLR: Negative.     Right Hip   Negative ROSI, SI compression and SI distraction.   SLR: Negative.     Additional Tests Details  Decreased pain in pubic symphysis with SI gapping  ASLR functional movement, mild arching of back no pain B      Ambulation     Observational Gait   Decreased walking speed and stride length.          See flowsheet for details of treatment following evaluation.      Assessment/Plan:     Assessment & Plan     Assessment  Impairments: activity intolerance, lacks appropriate home exercise program and pain with function  Functional Limitations: walking, uncomfortable because of pain, moving in bed, standing and stooping  Assessment details: The patient is a 27 y.o. female who presents to physical therapy today for pubic symphysis and low back pain. Upon  initial evaluation, the patient demonstrates the following impairments: tenderness of pubic symphysis, decreased activation of PFM and abdominals, with pelvic instability. Due to these impairments, the patient is unable to sit or stand for prolonged periods, do chores or move in bed without pain. The patient would benefit from skilled PT services to address functional limitations and impairments and to improve patient quality of life.      Prognosis: good    Goals  Plan Goals:   Short Term Goals (3 weeks)  1.  Patient to be compliant with initial HEP  2.  Patient to report 25% improvement in tolerance to prolonged sitting for improved function with travel/errands  3.  Patient will report no more than 2/10 pain with activity for improved QOL    Long Term Goals (6 weeks)  1. Patient to be independent with final HEP  2. Pt will be able to perform household chores with no more than 1/10 discomfort for improved ADLs.   3. Patient to improve PGQ score by 50% for improved functional mobility.     Plan  Frequency: 1x week  Duration in visits: 6  Duration in weeks: 6  Treatment plan discussed with: patient      Plan  Therapy options: will be seen for skilled physical therapy services  Planned modality interventions: TENS, ultrasound, cryotherapy, thermotherapy (hydrocollator packs)  Planned therapy interventions: abdominal trunk stabilization, manual therapy, neuromuscular re-education, body mechanics training, flexibility, functional ROM exercises, gait training, home exercise program, joint mobilization, therapeutic activities, stretching, strengthening, spinal/joint mobilization, soft tissue mobilization and postural training, dry needling      Treatment Time: 1130/1208  Timed:         Manual Therapy:    0     mins  39679;     Therapeutic Exercise:    8     mins  56830;     Neuromuscular Alexandre:    0    mins  52701;    Therapeutic Activity:     2     mins  91694;     Gait Trainin     mins  68918;     Ultrasound:      0     mins  51124;    Ionto                               0    mins   90835  Self Care                       0     mins   76068  Canalith Repos    0     mins 28988      Un-Timed:  Electrical Stimulation:    0     mins  31161 ( );  Dry Needling     0     mins self-pay  Traction     0     mins 73419  Low Eval     0     Mins  31430  Mod Eval     28     Mins  60475  High Eval                       0     Mins  51501  Re-Eval                           0    mins  52328        Timed Treatment:   10   mins   Total Treatment:     38   mins    PT SIGNATURE:   SARAY Ruggiero License # [id number]    DATE TREATMENT INITIATED: 09/19/2022  Initial Certification  Certification Period: 12/20/2022  I certify that the therapy services are furnished while this patient is under my care.  The services outlined above are required by this patient, and will be reviewed every 90 days.       PHYSICIAN: Dahiana Landin MD  NPI: [id number]                                           DATE: 09/19/2022     Please sign and return via fax to [phone]. Thank you, [facility].

## 2022-09-22 ENCOUNTER — ROUTINE PRENATAL (OUTPATIENT)
Dept: OBSTETRICS AND GYNECOLOGY | Facility: CLINIC | Age: 27
End: 2022-09-22

## 2022-09-22 VITALS — DIASTOLIC BLOOD PRESSURE: 70 MMHG | WEIGHT: 155.8 LBS | SYSTOLIC BLOOD PRESSURE: 118 MMHG | BODY MASS INDEX: 24.04 KG/M2

## 2022-09-22 DIAGNOSIS — Z3A.36 36 WEEKS GESTATION OF PREGNANCY: Primary | ICD-10-CM

## 2022-09-22 LAB
GLUCOSE UR STRIP-MCNC: NEGATIVE MG/DL
PROT UR STRIP-MCNC: NEGATIVE MG/DL

## 2022-09-22 PROCEDURE — 0502F SUBSEQUENT PRENATAL CARE: CPT | Performed by: OBSTETRICS & GYNECOLOGY

## 2022-09-22 NOTE — PROGRESS NOTES
OB FOLLOW UP  CC- Here for care of pregnancy        Nika Wynn is a 27 y.o.  36w5d patient being seen today for her obstetrical follow up visit. Patient reports irregular contractions . She states occasional menstrual cramping.     Her prenatal care is complicated by (and status) : None  Patient Active Problem List   Diagnosis   • Mitral valve prolapse   • Mitral regurgitation   • Connective tissue disorder (HCC)   • Pregnancy       GBS Status: Done Today. She is not allergic to PCN.    No Known Allergies       Flu Status: Desires at future appt  Her Delivery Plan is: Does not desire IOL    US today: no  Non Stress Test: No.      ROS -   Patient Reports : No Problems  Patient Denies: Loss of Fluid, Vision Changes, Vomiting  and Contractions  Fetal Movement : normal  All other systems reviewed and are negative.       The additional following portions of the patient's history were reviewed and updated as appropriate: allergies and current medications.    I have reviewed and agree with the HPI, ROS, and historical information as entered above. Dahiana Landin MD      EXAM:     Prenatal Vitals  BP: 118/70  Weight: 70.7 kg (155 lb 12.8 oz)         Closed cervix.  FHT positive         Urine Glucose Read-only: Negative  Urine Protein Read-only: Negative       Assessment and Plan    Problem List Items Addressed This Visit        Gravid and     Pregnancy - Primary    Relevant Orders    POC Urinalysis Dipstick (Completed)    Group B Streptococcus Culture - Swab, Vaginal/Rectum          1. Pregnancy at 36w5d  2. Fetal status reassuring.   3. Reviewed Pre-eclampsia signs/symptoms  4. Delivery options reviewed with patient  5. Signs of labor reviewed  6. Kick counts reviewed  7. Activity and Exercise discussed.  F/u in one week.    Dahiana Landin MD  2022

## 2022-09-23 ENCOUNTER — LAB (OUTPATIENT)
Dept: LAB | Facility: HOSPITAL | Age: 27
End: 2022-09-23

## 2022-09-23 DIAGNOSIS — Z3A.36 36 WEEKS GESTATION OF PREGNANCY: Primary | ICD-10-CM

## 2022-09-23 PROCEDURE — 87081 CULTURE SCREEN ONLY: CPT

## 2022-09-26 ENCOUNTER — TREATMENT (OUTPATIENT)
Dept: PHYSICAL THERAPY | Facility: CLINIC | Age: 27
End: 2022-09-26

## 2022-09-26 DIAGNOSIS — O26.899 PELVIC PAIN IN PREGNANCY: Primary | ICD-10-CM

## 2022-09-26 DIAGNOSIS — M62.81 MUSCLE WEAKNESS: ICD-10-CM

## 2022-09-26 DIAGNOSIS — R10.2 PELVIC PAIN IN PREGNANCY: Primary | ICD-10-CM

## 2022-09-26 LAB — BACTERIA SPEC AEROBE CULT: NORMAL

## 2022-09-26 PROCEDURE — 97112 NEUROMUSCULAR REEDUCATION: CPT | Performed by: PHYSICAL THERAPIST

## 2022-09-26 PROCEDURE — 97110 THERAPEUTIC EXERCISES: CPT | Performed by: PHYSICAL THERAPIST

## 2022-09-26 NOTE — PROGRESS NOTES
Physical Therapy Daily Treatment Note  Patient: Nika Wynn   : 1995  Diagnosis/ICD-10 Code:  Pelvic pain in pregnancy [O26.899, R10.2]  Referring practitioner: Dahiana Landin MD  Date of Initial Visit: Type: THERAPY  Noted: 2022  Today's Date: 10/25/2022  Patient seen for 2 sessions      Subjective   Nika Wynn reports some catches in back and a feeling of heavy baby on pelvic floor. Busy but doing HEP some.   Pain Rating (0-10): 3      Objective   verbal consent obtained for external pelvic exam/treatment with declined need for second person in room    sidelying SMEG: rest 4.7, 9.1 avg, max 19.0; rest decreased to 2.1 with relaxation techniques    See Exercise, Manual, and Modality Logs for complete treatment.       Assessment/Plan  Pt did well with SEMG for improved relaxation and coordination of PFM. She was instructed in PFM coordination exercises in sitting and educated on postpartum recovery. Will continue to progress stability as indicated to decrease pain until delivery.     Progress per Plan of Care         1406/1445   Timed:         Manual Therapy:    0     mins  97458;     Therapeutic Exercise:    9     mins  11676;     Neuromuscular Alexandre:    30    mins  86493;    Therapeutic Activity:     0     mins  27755;     Gait Trainin     mins  91608;     Ultrasound:     0     mins  03967;    Ionto                               0    mins   65229  Self Care                       0     mins   28905  Canalith Repos               0    mins  12240    Un-Timed:  Electrical Stimulation:    0     mins  16025 (MC );  Dry Needling     0     mins self-pay  Traction     0     mins 92217  Low Eval     0     Mins  46357  Mod Eval     00     Mins  91344  High Eval                       0     Mins  14246  Re-Eval                           0    mins  97667    Timed Treatment:   39   mins   Total Treatment:     39   mins    Ej Shipley PT  [place] License # [id number]  Physical Therapist

## 2022-09-30 ENCOUNTER — ROUTINE PRENATAL (OUTPATIENT)
Dept: OBSTETRICS AND GYNECOLOGY | Facility: CLINIC | Age: 27
End: 2022-09-30

## 2022-09-30 VITALS — SYSTOLIC BLOOD PRESSURE: 112 MMHG | BODY MASS INDEX: 24.38 KG/M2 | WEIGHT: 158 LBS | DIASTOLIC BLOOD PRESSURE: 70 MMHG

## 2022-09-30 DIAGNOSIS — Z3A.37 37 WEEKS GESTATION OF PREGNANCY: Primary | ICD-10-CM

## 2022-09-30 LAB
GLUCOSE UR STRIP-MCNC: NEGATIVE MG/DL
PROT UR STRIP-MCNC: NEGATIVE MG/DL

## 2022-09-30 PROCEDURE — 0502F SUBSEQUENT PRENATAL CARE: CPT

## 2022-09-30 NOTE — PROGRESS NOTES
OB FOLLOW UP  CC- Here for care of pregnancy        Nika Wynn is a 27 y.o.  37w6d patient being seen today for her obstetrical follow up visit. Patient reports a couple sharp pains in her lower abdomen yesterday evening and occasional irregular BH. Also reports a pea sized spot of blood in her underwear twice last night. Denies any recent IC, heavy lifting, or straining for BM. States she shaved yesterday but didn't think she had cut herself. Received Rhogam at 28 weeks on 2022.    Her prenatal care is complicated by (and status) :   Patient Active Problem List   Diagnosis   • Mitral valve prolapse   • Mitral regurgitation   • Connective tissue disorder (HCC)   • Pregnancy       GBS Status:   Group B Strep Culture   Date Value Ref Range Status   2022 No Group B Streptococcus isolated  Final         No Known Allergies       Flu Status: Desires at future appt  Her Delivery Plan is: Desires IOL after 40wks. Scheduled    History of COVID: Yes, date 2022  US today: no  Non Stress Test: No.       ROS -   Patient Reports : Possible Vaginal Spotting/Poss cut from shaving, Bx Hx Contractions and a couple sharp pains  Patient Denies: Loss of Fluid, Vaginal Spotting, Vision Changes, Headaches and Epigastric pain  Fetal Movement : normal  All other systems reviewed and are negative.       The additional following portions of the patient's history were reviewed and updated as appropriate: allergies, current medications, past family history, past medical history, past social history, past surgical history and problem list.    I have reviewed and agree with the HPI, ROS, and historical information as entered above. Shannon Heaton, APRN      EXAM:     Prenatal Vitals  BP: 112/70  Weight: 71.7 kg (158 lb)   Fetal Heart Rate: 135       Dilation/Effacement/Station  Dilation: Fingertip  Effacement (%): 60  No vaginal blood seen     Urine Glucose Read-only: Negative  Urine Protein Read-only:  Negative       Assessment and Plan  1. Flu shot complete  Problem List Items Addressed This Visit        Gravid and     Pregnancy - Primary    Relevant Orders    POC Urinalysis Dipstick (Completed)          1. Pregnancy at 37w6d  2. Fetal status reassuring.   3. Reviewed Pre-eclampsia signs/symptoms  4. Reviewed upcoming IOL with patient. Scheduled 10/21/22  5. Delivery options reviewed with patient  6. Signs of labor reviewed  7. Kick counts reviewed  8. Activity and Exercise discussed.  9. Belly band  10. Tylenol PRN  11. Call for any vag bleeding/spotting  12. Continue PFT   Return in about 6 days (around 10/6/2022) for JACQUELYN ramirez/ SELMA Heaton, APRN  2022

## 2022-10-02 ENCOUNTER — HOSPITAL ENCOUNTER (INPATIENT)
Facility: HOSPITAL | Age: 27
LOS: 2 days | Discharge: HOME | End: 2022-10-04
Attending: OBSTETRICS & GYNECOLOGY | Admitting: OBSTETRICS & GYNECOLOGY

## 2022-10-02 PROBLEM — Z37.9 NORMAL LABOR: Status: ACTIVE | Noted: 2022-10-02

## 2022-10-02 LAB
ABO GROUP BLD: NORMAL
ALP SERPL-CCNC: 168 U/L (ref 39–117)
ALT SERPL W P-5'-P-CCNC: 11 U/L (ref 1–33)
AST SERPL-CCNC: 13 U/L (ref 1–32)
BILIRUB SERPL-MCNC: 0.2 MG/DL (ref 0–1.2)
BLD GP AB SCN SERPL QL: POSITIVE
CREAT SERPL-MCNC: 0.46 MG/DL (ref 0.57–1)
DEPRECATED RDW RBC AUTO: 48.9 FL (ref 37–54)
ERYTHROCYTE [DISTWIDTH] IN BLOOD BY AUTOMATED COUNT: 14.2 % (ref 12.3–15.4)
FETAL BLEED: NEGATIVE
HCT VFR BLD AUTO: 39.9 % (ref 34–46.6)
HGB BLD-MCNC: 13.1 G/DL (ref 12–15.9)
LDH SERPL-CCNC: 155 U/L (ref 135–214)
MCH RBC QN AUTO: 31.1 PG (ref 26.6–33)
MCHC RBC AUTO-ENTMCNC: 32.8 G/DL (ref 31.5–35.7)
MCV RBC AUTO: 94.8 FL (ref 79–97)
NUMBER OF DOSES: NORMAL
PLATELET # BLD AUTO: 166 10*3/MM3 (ref 140–450)
PMV BLD AUTO: 10.1 FL (ref 6–12)
RBC # BLD AUTO: 4.21 10*6/MM3 (ref 3.77–5.28)
RESIDUAL RHIG DETECTED: NORMAL
RH BLD: NEGATIVE
T&S EXPIRATION DATE: NORMAL
URATE SERPL-MCNC: 3.6 MG/DL (ref 2.4–5.7)
WBC NRBC COR # BLD: 11.4 10*3/MM3 (ref 3.4–10.8)

## 2022-10-02 PROCEDURE — 86850 RBC ANTIBODY SCREEN: CPT | Performed by: OBSTETRICS & GYNECOLOGY

## 2022-10-02 PROCEDURE — 84075 ASSAY ALKALINE PHOSPHATASE: CPT | Performed by: OBSTETRICS & GYNECOLOGY

## 2022-10-02 PROCEDURE — 83615 LACTATE (LD) (LDH) ENZYME: CPT | Performed by: OBSTETRICS & GYNECOLOGY

## 2022-10-02 PROCEDURE — 82565 ASSAY OF CREATININE: CPT | Performed by: OBSTETRICS & GYNECOLOGY

## 2022-10-02 PROCEDURE — 86900 BLOOD TYPING SEROLOGIC ABO: CPT

## 2022-10-02 PROCEDURE — 0UQMXZZ REPAIR VULVA, EXTERNAL APPROACH: ICD-10-PCS | Performed by: OBSTETRICS & GYNECOLOGY

## 2022-10-02 PROCEDURE — 86901 BLOOD TYPING SEROLOGIC RH(D): CPT | Performed by: OBSTETRICS & GYNECOLOGY

## 2022-10-02 PROCEDURE — 85027 COMPLETE CBC AUTOMATED: CPT | Performed by: OBSTETRICS & GYNECOLOGY

## 2022-10-02 PROCEDURE — 59025 FETAL NON-STRESS TEST: CPT

## 2022-10-02 PROCEDURE — 84550 ASSAY OF BLOOD/URIC ACID: CPT | Performed by: OBSTETRICS & GYNECOLOGY

## 2022-10-02 PROCEDURE — S0260 H&P FOR SURGERY: HCPCS | Performed by: OBSTETRICS & GYNECOLOGY

## 2022-10-02 PROCEDURE — 59400 OBSTETRICAL CARE: CPT | Performed by: OBSTETRICS & GYNECOLOGY

## 2022-10-02 PROCEDURE — 86870 RBC ANTIBODY IDENTIFICATION: CPT | Performed by: OBSTETRICS & GYNECOLOGY

## 2022-10-02 PROCEDURE — 86900 BLOOD TYPING SEROLOGIC ABO: CPT | Performed by: OBSTETRICS & GYNECOLOGY

## 2022-10-02 PROCEDURE — 25010000002 OXYTOCIN PER 10 UNITS: Performed by: OBSTETRICS & GYNECOLOGY

## 2022-10-02 PROCEDURE — 3E0334Z INTRODUCTION OF SERUM, TOXOID AND VACCINE INTO PERIPHERAL VEIN, PERCUTANEOUS APPROACH: ICD-10-PCS | Performed by: OBSTETRICS & GYNECOLOGY

## 2022-10-02 PROCEDURE — 0KQM0ZZ REPAIR PERINEUM MUSCLE, OPEN APPROACH: ICD-10-PCS | Performed by: OBSTETRICS & GYNECOLOGY

## 2022-10-02 PROCEDURE — 82247 BILIRUBIN TOTAL: CPT | Performed by: OBSTETRICS & GYNECOLOGY

## 2022-10-02 PROCEDURE — 25010000002 RHO D IMMUNE GLOBULIN 1500 UNIT/2ML SOLUTION PREFILLED SYRINGE: Performed by: OBSTETRICS & GYNECOLOGY

## 2022-10-02 PROCEDURE — 86901 BLOOD TYPING SEROLOGIC RH(D): CPT

## 2022-10-02 PROCEDURE — 84450 TRANSFERASE (AST) (SGOT): CPT | Performed by: OBSTETRICS & GYNECOLOGY

## 2022-10-02 PROCEDURE — 85461 HEMOGLOBIN FETAL: CPT | Performed by: OBSTETRICS & GYNECOLOGY

## 2022-10-02 PROCEDURE — 59025 FETAL NON-STRESS TEST: CPT | Performed by: OBSTETRICS & GYNECOLOGY

## 2022-10-02 PROCEDURE — 84460 ALANINE AMINO (ALT) (SGPT): CPT | Performed by: OBSTETRICS & GYNECOLOGY

## 2022-10-02 RX ORDER — ACETAMINOPHEN 500 MG
1000 TABLET ORAL EVERY 6 HOURS PRN
Status: DISCONTINUED | OUTPATIENT
Start: 2022-10-02 | End: 2022-10-04 | Stop reason: HOSPADM

## 2022-10-02 RX ORDER — SODIUM CHLORIDE, SODIUM LACTATE, POTASSIUM CHLORIDE, CALCIUM CHLORIDE 600; 310; 30; 20 MG/100ML; MG/100ML; MG/100ML; MG/100ML
125 INJECTION, SOLUTION INTRAVENOUS CONTINUOUS
Status: DISCONTINUED | OUTPATIENT
Start: 2022-10-02 | End: 2022-10-03

## 2022-10-02 RX ORDER — MAGNESIUM CARB/ALUMINUM HYDROX 105-160MG
30 TABLET,CHEWABLE ORAL ONCE
Status: DISCONTINUED | OUTPATIENT
Start: 2022-10-02 | End: 2022-10-02 | Stop reason: HOSPADM

## 2022-10-02 RX ORDER — IBUPROFEN 600 MG/1
600 TABLET ORAL EVERY 6 HOURS PRN
Status: DISCONTINUED | OUTPATIENT
Start: 2022-10-02 | End: 2022-10-04 | Stop reason: HOSPADM

## 2022-10-02 RX ORDER — LIDOCAINE HYDROCHLORIDE 10 MG/ML
5 INJECTION, SOLUTION EPIDURAL; INFILTRATION; INTRACAUDAL; PERINEURAL AS NEEDED
Status: DISCONTINUED | OUTPATIENT
Start: 2022-10-02 | End: 2022-10-02 | Stop reason: HOSPADM

## 2022-10-02 RX ORDER — SODIUM CHLORIDE 0.9 % (FLUSH) 0.9 %
1-10 SYRINGE (ML) INJECTION AS NEEDED
Status: DISCONTINUED | OUTPATIENT
Start: 2022-10-02 | End: 2022-10-04 | Stop reason: HOSPADM

## 2022-10-02 RX ORDER — OXYTOCIN 10 [USP'U]/ML
INJECTION, SOLUTION INTRAMUSCULAR; INTRAVENOUS
Status: DISCONTINUED
Start: 2022-10-02 | End: 2022-10-04 | Stop reason: HOSPADM

## 2022-10-02 RX ORDER — METHYLERGONOVINE MALEATE 0.2 MG/ML
200 INJECTION INTRAVENOUS ONCE AS NEEDED
Status: DISCONTINUED | OUTPATIENT
Start: 2022-10-02 | End: 2022-10-02 | Stop reason: HOSPADM

## 2022-10-02 RX ORDER — ONDANSETRON 4 MG/1
4 TABLET, FILM COATED ORAL EVERY 6 HOURS PRN
Status: DISCONTINUED | OUTPATIENT
Start: 2022-10-02 | End: 2022-10-02 | Stop reason: HOSPADM

## 2022-10-02 RX ORDER — SODIUM CHLORIDE 0.9 % (FLUSH) 0.9 %
3 SYRINGE (ML) INJECTION EVERY 12 HOURS SCHEDULED
Status: DISCONTINUED | OUTPATIENT
Start: 2022-10-02 | End: 2022-10-02 | Stop reason: HOSPADM

## 2022-10-02 RX ORDER — OXYTOCIN 10 [USP'U]/ML
10 INJECTION, SOLUTION INTRAMUSCULAR; INTRAVENOUS ONCE
Status: COMPLETED | OUTPATIENT
Start: 2022-10-02 | End: 2022-10-02

## 2022-10-02 RX ORDER — HYDROCORTISONE 25 MG/G
1 CREAM TOPICAL AS NEEDED
Status: DISCONTINUED | OUTPATIENT
Start: 2022-10-02 | End: 2022-10-04 | Stop reason: HOSPADM

## 2022-10-02 RX ORDER — ONDANSETRON 2 MG/ML
4 INJECTION INTRAMUSCULAR; INTRAVENOUS EVERY 6 HOURS PRN
Status: DISCONTINUED | OUTPATIENT
Start: 2022-10-02 | End: 2022-10-02 | Stop reason: HOSPADM

## 2022-10-02 RX ORDER — BISACODYL 10 MG
10 SUPPOSITORY, RECTAL RECTAL DAILY PRN
Status: DISCONTINUED | OUTPATIENT
Start: 2022-10-03 | End: 2022-10-04 | Stop reason: HOSPADM

## 2022-10-02 RX ORDER — SODIUM CHLORIDE 0.9 % (FLUSH) 0.9 %
10 SYRINGE (ML) INJECTION AS NEEDED
Status: DISCONTINUED | OUTPATIENT
Start: 2022-10-02 | End: 2022-10-02 | Stop reason: HOSPADM

## 2022-10-02 RX ORDER — CARBOPROST TROMETHAMINE 250 UG/ML
250 INJECTION, SOLUTION INTRAMUSCULAR AS NEEDED
Status: DISCONTINUED | OUTPATIENT
Start: 2022-10-02 | End: 2022-10-02 | Stop reason: HOSPADM

## 2022-10-02 RX ORDER — DOCUSATE SODIUM 100 MG/1
100 CAPSULE, LIQUID FILLED ORAL 2 TIMES DAILY
Status: DISCONTINUED | OUTPATIENT
Start: 2022-10-02 | End: 2022-10-04 | Stop reason: HOSPADM

## 2022-10-02 RX ORDER — MISOPROSTOL 200 UG/1
800 TABLET ORAL AS NEEDED
Status: DISCONTINUED | OUTPATIENT
Start: 2022-10-02 | End: 2022-10-02 | Stop reason: HOSPADM

## 2022-10-02 RX ORDER — BUTORPHANOL TARTRATE 1 MG/ML
1 INJECTION, SOLUTION INTRAMUSCULAR; INTRAVENOUS
Status: DISCONTINUED | OUTPATIENT
Start: 2022-10-02 | End: 2022-10-02 | Stop reason: HOSPADM

## 2022-10-02 RX ADMIN — WITCH HAZEL 1 PAD: 500 SOLUTION RECTAL; TOPICAL at 12:19

## 2022-10-02 RX ADMIN — DOCUSATE SODIUM 100 MG: 100 CAPSULE, LIQUID FILLED ORAL at 12:19

## 2022-10-02 RX ADMIN — OXYTOCIN 10 UNITS: 10 INJECTION INTRAVENOUS at 07:51

## 2022-10-02 RX ADMIN — SODIUM CHLORIDE, POTASSIUM CHLORIDE, SODIUM LACTATE AND CALCIUM CHLORIDE 1000 ML: 600; 310; 30; 20 INJECTION, SOLUTION INTRAVENOUS at 04:10

## 2022-10-02 RX ADMIN — LIDOCAINE HYDROCHLORIDE 5 ML: 10 INJECTION, SOLUTION EPIDURAL; INFILTRATION; INTRACAUDAL; PERINEURAL at 07:51

## 2022-10-02 RX ADMIN — DOCUSATE SODIUM 100 MG: 100 CAPSULE, LIQUID FILLED ORAL at 20:49

## 2022-10-02 RX ADMIN — Medication: at 12:18

## 2022-10-02 RX ADMIN — Medication 1 APPLICATION: at 12:19

## 2022-10-02 RX ADMIN — HUMAN RHO(D) IMMUNE GLOBULIN 1500 UNITS: 1500 SOLUTION INTRAMUSCULAR; INTRAVENOUS at 15:11

## 2022-10-02 RX ADMIN — SODIUM CHLORIDE, POTASSIUM CHLORIDE, SODIUM LACTATE AND CALCIUM CHLORIDE 125 ML/HR: 600; 310; 30; 20 INJECTION, SOLUTION INTRAVENOUS at 05:14

## 2022-10-02 NOTE — L&D DELIVERY NOTE
[facility]   Vaginal Delivery Note    Patient Name: Nika Wynn  : 1995  MRN: [id number]    Date of Delivery: 10/2/2022     Diagnosis     Pre & Post-Delivery:  Intrauterine pregnancy at 38w1d  Labor status: Active spontaneous labor     Normal labor             Problem List    Transfer to Postpartum     Review the Delivery Report for details.     Delivery     Delivery:      YOB: 2020    Time of Birth:  Gestational Age 7:37 AM   38w1d     Anesthesia:      Delivering clinician:     Forceps?   No   Vacuum? No    Shoulder dystocia present: No        Delivery narrative:  Pt spont del over an intact perineum.  Cord clamping delayed until cord pulsing stopped.  Cord clamped and cut, cord blood obtained and placenta del intact.  Second degree right labial laceration repaired with 3 interrupted sutures      Infant     Findings: male  infant     Infant observations: Weight: No birth weight on file.   Length:   in  Observations/Comments:        Apgars:   @ 1 minute /      @ 5 minutes   Infant Name: Angel     Placenta & Cord         Placenta delivered  Spontaneous  at   10/2/2022  7:45 AM     Cord: 3 vessels  present.   Nuchal Cord?  yes; Number of nuchal loops present: 1     Cord blood obtained:     Cord gases obtained:      Cord gas results: Venous:  No results found for: PHCVEN    Arterial:  No results found for: PHCART     Repair      Episiotomy: Not recorded     No    Lacerations: Yes  Laceration Information  Laceration Repaired?   Perineal:       Periurethral:       Labial:       Sulcus:       Vaginal:       Cervical:         Suture used for repair: 3-0 vicryl rapied   Estimated Blood Loss:250ml       Quantitative Blood Loss:          Complications     none    Disposition     Mother to Mother Baby/Postpartum  in stable condition currently.  Baby to remains with mom  in stable condition currently.    Camilo Rosas MD  10/02/22  08:04 EDT

## 2022-10-02 NOTE — PROGRESS NOTES
10/2/2022  PPDD    Subjective   Nika feels well.  Patient describes her lochia less than menses.  Pain is well controlled       Objective   Temp: Temp:  [97.7 °F (36.5 °C)-98.5 °F (36.9 °C)] 98.3 °F (36.8 °C) Temp src: Oral   BP: BP: (102-132)/(63-82) 131/72        Pulse: Heart Rate:  [] 116  RR: Resp:  [16-18] 18    General:  No acute distress   Abdomen: Fundus firm and beneath umbilicus   Pelvis: deferred     Lab Results   Component Value Date    WBC 11.40 (H) 10/02/2022    HGB 13.1 10/02/2022    HCT 39.9 10/02/2022    MCV 94.8 10/02/2022     10/02/2022    HEPBSAG Negative 02/22/2022       Assessment  1. PPDD: after vaginal delivery . Stable, doing well  2. MBT O-. Infant RH pending  3. Male infant, breastfeeding, desires circumcision.    Plan  1. Routine postpartum care.      This note has been electronically signed.    Jose Armando Perdomo, APRN  October 2, 2022

## 2022-10-02 NOTE — H&P
"Nika Pisano Livers  1995  [id number]  [id number]    CC: contractions/ruptured  HPI:  Patient is 27 y.o. female   currently at 38w1d presents with c/o SROM at midnight with contractions following.  Pt rates contractions 8-9/10, radiates to back, grossly ruptured with clear fluid, no bleeding.  Normal FM.  BPNC to date    PMH:  Current meds: PNV  Illnesses: MVP, degenerative disc, non-specific connective tissue disease  Surgeries: none  Allergies: NKDA    Past OB History:       OB History    Para Term  AB Living   1 0 0 0 0 0   SAB IAB Ectopic Molar Multiple Live Births   0 0 0 0 0 0      # Outcome Date GA Lbr Wyatt/2nd Weight Sex Delivery Anes PTL Lv   1 Current                     SH: tob neg , EtOH neg, drugs neg  FH: heart dz pos , diabetes pos , cancer pos    General ROS: contractions, leaking.   All other systems reviewed and are negative.      Physical Examination: General appearance - alert, well appearing, and in no distress  Vital signs - LMP 2021   HEENT: normocephalic, atraumatic,oropharynx clear, appearance of ears and nose normal  Neck - supple, no significant adenopathy, no thyromegaly  Lymphatics - no palpable lymphadenopathy in the neck or groin, no hepatosplenomegaly  Chest - clear to auscultation, no wheezes, rales or rhonchi, respiratory effort non-labored  Heart - normal rate, regular rhythm, no murmurs, rubs, clicks or gallops, no JVD, no lower extremity edema  Abdomen - soft, nontender, nondistended, no masses, no hepatosplenomegaly  no rebound tenderness noted, bowel sounds normal  Vaginal Exam: 3-4/90%/-1, grossly ruptured ,external genitalia normal  Extremities - no pedal edema noted, no calf tend  Skin -warm and dry, normal coloration and turgor, no rashes, no suspicious skin lesions noted    Fetal monitoring: indication contractions , onset 0335 , offset 0400 , baseline 135 , mod BTB variability , multiple accels (15 X 15), no decels, q2\" contractions, " interpretation reactive NST    Radiology     Assessment 1)IUP 38 1/7 weeks   2)normal labor    Plan 1)admit    Camilo Rosas MD  10/2/2022  03:56 EDT

## 2022-10-02 NOTE — PLAN OF CARE
Goal Outcome Evaluation:  , no labor complications noted, APGARs 9 and 9. Light bleeding, uterus firm without massage, midline during 1st hour of postpartum recovery.

## 2022-10-02 NOTE — SIGNIFICANT NOTE
10/02/22 1100   Maternal Information   Person Making Referral lactation consultant   Maternal Infant Feeding   Maternal Emotional State independent;receptive   Infant Positioning cradle;cross-cradle  (assisted mother to reposition infant from cradle to cross-cradle in hopes of getting a deeper latch. infant sleepy but able to open wide & take a few suckles)   Pain with Feeding no   Comfort Measures Before/During Feeding latch adjusted;infant position adjusted;maternal position adjusted;suction broken using finger

## 2022-10-02 NOTE — SIGNIFICANT NOTE
10/02/22 1000   Maternal Information   Date of Referral 10/02/22   Person Making Referral lactation consultant   Maternal Reason for Referral no prior breastfeeding experience  (courtesy visit for new delivery)   Maternal Assessment   Breast Size Issue none   Breast Shape Bilateral:;round   Breast Density Bilateral:;soft   Nipples Bilateral:;everted   Left Nipple Symptoms intact;nontender   Right Nipple Symptoms intact;nontender   Maternal Infant Feeding   Maternal Emotional State independent;receptive   Latch Assistance   (to call out for latch check with next feeding)   Milk Expression/Equipment   Breast Pump Type double electric, personal;manual pump  (pt has a Haakka along with a Medela pump.)

## 2022-10-02 NOTE — PLAN OF CARE
Goal Outcome Evaluation:               Pt is coping with labor pain with breathing techniques, position changes.

## 2022-10-03 LAB
BASOPHILS # BLD AUTO: 0.06 10*3/MM3 (ref 0–0.2)
BASOPHILS NFR BLD AUTO: 0.4 % (ref 0–1.5)
DEPRECATED RDW RBC AUTO: 48.7 FL (ref 37–54)
EOSINOPHIL # BLD AUTO: 0.11 10*3/MM3 (ref 0–0.4)
EOSINOPHIL NFR BLD AUTO: 0.7 % (ref 0.3–6.2)
ERYTHROCYTE [DISTWIDTH] IN BLOOD BY AUTOMATED COUNT: 14.3 % (ref 12.3–15.4)
HCT VFR BLD AUTO: 39 % (ref 34–46.6)
HGB BLD-MCNC: 12.9 G/DL (ref 12–15.9)
IMM GRANULOCYTES # BLD AUTO: 0.14 10*3/MM3 (ref 0–0.05)
IMM GRANULOCYTES NFR BLD AUTO: 0.9 % (ref 0–0.5)
LYMPHOCYTES # BLD AUTO: 2.45 10*3/MM3 (ref 0.7–3.1)
LYMPHOCYTES NFR BLD AUTO: 16.6 % (ref 19.6–45.3)
MCH RBC QN AUTO: 31.2 PG (ref 26.6–33)
MCHC RBC AUTO-ENTMCNC: 33.1 G/DL (ref 31.5–35.7)
MCV RBC AUTO: 94.4 FL (ref 79–97)
MONOCYTES # BLD AUTO: 0.88 10*3/MM3 (ref 0.1–0.9)
MONOCYTES NFR BLD AUTO: 6 % (ref 5–12)
NEUTROPHILS NFR BLD AUTO: 11.11 10*3/MM3 (ref 1.7–7)
NEUTROPHILS NFR BLD AUTO: 75.4 % (ref 42.7–76)
NRBC BLD AUTO-RTO: 0 /100 WBC (ref 0–0.2)
PLATELET # BLD AUTO: 188 10*3/MM3 (ref 140–450)
PMV BLD AUTO: 10.7 FL (ref 6–12)
RBC # BLD AUTO: 4.13 10*6/MM3 (ref 3.77–5.28)
WBC NRBC COR # BLD: 14.75 10*3/MM3 (ref 3.4–10.8)

## 2022-10-03 PROCEDURE — 85025 COMPLETE CBC W/AUTO DIFF WBC: CPT | Performed by: OBSTETRICS & GYNECOLOGY

## 2022-10-03 PROCEDURE — 0503F POSTPARTUM CARE VISIT: CPT | Performed by: NURSE PRACTITIONER

## 2022-10-03 NOTE — PROGRESS NOTES
10/3/2022  PPD #1    Subjective   Nika feels well.  Patient describes her lochia less than menses.  Pain is well controlled       Objective   Temp: Temp:  [98.4 °F (36.9 °C)-98.9 °F (37.2 °C)] 98.4 °F (36.9 °C) Temp src: Oral   BP: BP: (100-123)/(59-82) 120/70        Pulse: Heart Rate:  [] 87  RR: Resp:  [16-18] 18    General:  No acute distress   Abdomen: Fundus firm and beneath umbilicus   Pelvis: deferred     Lab Results   Component Value Date    WBC 14.75 (H) 10/03/2022    HGB 12.9 10/03/2022    HCT 39.0 10/03/2022    MCV 94.4 10/03/2022     10/03/2022    HEPBSAG Negative 02/22/2022       Assessment  1. PPD# 1 after vaginal delivery   2. Infant male, desires circumcision  3. Breast feeding  4. MBT= O negative, Baby O positive, Rhogam given    Plan  1. Routine postpartum care.      This note has been electronically signed.    No Ennis, APRN  October 3, 2022

## 2022-10-03 NOTE — PLAN OF CARE
Goal Outcome Evaluation:  Plan of Care Reviewed With: patient, spouse        Progress: improving  Outcome Evaluation: Vitals WNL. Pt denies pain. She ambulates in room independently. Fundus is U/2, firm, and midline. Light rubra present. Small clot passed this morning. Pt is eager to D/C home.

## 2022-10-03 NOTE — PAYOR COMM NOTE
"Nika Wynn (27 y.o. Female)             Date of Birth   1995    Social Security Number       Address   [address] [facility] [address]    Home Phone   [phone]    MRN   [id number]       Mu-ism   Rastafarian    Marital Status                               Admission Date   10/2/22    Admission Type   Elective    Admitting Provider   Dahiana Landin MD    Attending Provider   Dahiana Landin MD    Department, Room/Bed   [facility] MOTHER BABY 4A, N420/1       Discharge Date       Discharge Disposition       Discharge Destination                               Attending Provider: Dahiana Landin MD    Allergies: No Known Allergies    Isolation: None   Infection: None   Code Status: CPR   Advance Care Planning Activity    Ht: 172.7 cm (68\")   Wt: 71.7 kg (158 lb)    Admission Cmt: None   Principal Problem: None                Active Insurance as of 10/2/2022     Primary Coverage     Payor Plan Insurance Group Employer/Plan Group    ANTHEM BLUE CROSS ANTHEM BLUE CROSS BLUE SHIELD PPO [id number]     Payor Plan Address Payor Plan Phone Number Payor Plan Fax Number Effective Dates    [address] [phone]  4/17/2022 - None Entered    [facility] [id number]       Subscriber Name Subscriber Birth Date Member ID       JOESPH WYNN 5/11/1998 [id number]           Secondary Coverage     Payor Plan Insurance Group Employer/Plan Group    [place]      Payor Plan Address Payor Plan Phone Number Payor Plan Fax Number Effective Dates    [address]   1/22/2021 - None Entered    [facility] [address]       Subscriber Name Subscriber Birth Date Member ID       NIKA WYNN 1995 [id number]                 Emergency Contacts      (Rel.) Home Phone Work Phone Mobile Phone    joesph wynn (Spouse) [phone] -- [phone]            Insurance Information                ANTHEM BLUE CROSS/ANTHEM BLUE CROSS BLUE SHIELD " PPO Phone: [phone]    Subscriber: Vahid Wynn Subscriber#: [id number]    Group#: [id number] Precert#: --        [place] Phone: --    Subscriber: Nika Wynn Subscriber#: [id number]    Group#: -- Precert#: --             History & Physical      High, Camilo PHAM MD at 10/02/22 0356          Nika Wynn  1995  [id number]  [id number]    CC: contractions/ruptured  HPI:  Patient is 27 y.o. female   currently at 38w1d presents with c/o SROM at midnight with contractions following.  Pt rates contractions 8-9/10, radiates to back, grossly ruptured with clear fluid, no bleeding.  Normal FM.  BPNC to date    PMH:  Current meds: PNV  Illnesses: MVP, degenerative disc, non-specific connective tissue disease  Surgeries: none  Allergies: NKDA    Past OB History:       OB History    Para Term  AB Living   1 0 0 0 0 0   SAB IAB Ectopic Molar Multiple Live Births   0 0 0 0 0 0      # Outcome Date GA Lbr Wyatt/2nd Weight Sex Delivery Anes PTL Lv   1 Current                     SH: tob neg , EtOH neg, drugs neg  FH: heart dz pos , diabetes pos , cancer pos    General ROS: contractions, leaking.   All other systems reviewed and are negative.      Physical Examination: General appearance - alert, well appearing, and in no distress  Vital signs - LMP 2021   HEENT: normocephalic, atraumatic,oropharynx clear, appearance of ears and nose normal  Neck - supple, no significant adenopathy, no thyromegaly  Lymphatics - no palpable lymphadenopathy in the neck or groin, no hepatosplenomegaly  Chest - clear to auscultation, no wheezes, rales or rhonchi, respiratory effort non-labored  Heart - normal rate, regular rhythm, no murmurs, rubs, clicks or gallops, no JVD, no lower extremity edema  Abdomen - soft, nontender, nondistended, no masses, no hepatosplenomegaly  no rebound tenderness noted, bowel sounds normal  Vaginal Exam: 3-4/90%/-1, grossly ruptured  ",external genitalia normal  Extremities - no pedal edema noted, no calf tend  Skin -warm and dry, normal coloration and turgor, no rashes, no suspicious skin lesions noted    Fetal monitoring: indication contractions , onset 0335 , offset 0400 , baseline 135 , mod BTB variability , multiple accels (15 X 15), no decels, q2\" contractions, interpretation reactive NST    Radiology     Assessment 1)IUP 38 1/7 weeks   2)normal labor    Plan 1)admit    Camilo Rosas MD  10/2/2022  03:56 EDT                                                                      Electronically signed by Camilo Rosas MD at 10/02/22 0404          Physician Progress Notes (last 48 hours)      No Ennis APRN at 10/03/22 0937          10/3/2022  PPD #1    Subjective   Nika feels well.  Patient describes her lochia less than menses.  Pain is well controlled       Objective   Temp: Temp:  [98.4 °F (36.9 °C)-98.9 °F (37.2 °C)] 98.4 °F (36.9 °C) Temp src: Oral   BP: BP: (100-123)/(59-82) 120/70        Pulse: Heart Rate:  [] 87  RR: Resp:  [16-18] 18    General:  No acute distress   Abdomen: Fundus firm and beneath umbilicus   Pelvis: deferred     Lab Results   Component Value Date    WBC 14.75 (H) 10/03/2022    HGB 12.9 10/03/2022    HCT 39.0 10/03/2022    MCV 94.4 10/03/2022     10/03/2022    HEPBSAG Negative 02/22/2022       Assessment  1. PPD# 1 after vaginal delivery   2. Infant male, desires circumcision  3. Breast feeding  4. MBT= O negative, Baby O positive, Rhogam given    Plan  1. Routine postpartum care.      This note has been electronically signed.    CHICO Cowan  October 3, 2022    Electronically signed by No Ennis APRN at 10/03/22 0939     Jose Armando Perdomo APRN at 10/02/22 1027          10/2/2022  PPDD    Subjective   Nika feels well.  Patient describes her lochia less than menses.  Pain is well controlled       Objective   Temp: Temp:  [97.7 °F (36.5 °C)-98.5 °F (36.9 °C)] 98.3 °F " (36.8 °C) Temp src: Oral   BP: BP: (102-132)/(63-82) 131/72        Pulse: Heart Rate:  [] 116  RR: Resp:  [16-18] 18    General:  No acute distress   Abdomen: Fundus firm and beneath umbilicus   Pelvis: deferred     Lab Results   Component Value Date    WBC 11.40 (H) 10/02/2022    HGB 13.1 10/02/2022    HCT 39.9 10/02/2022    MCV 94.8 10/02/2022     10/02/2022    HEPBSAG Negative 02/22/2022       Assessment  5. PPDD: after vaginal delivery . Stable, doing well  6. MBT O-. Infant RH pending  7. Male infant, breastfeeding, desires circumcision.    Plan  2. Routine postpartum care.      This note has been electronically signed.    CHICO Lopez  October 2, 2022    Electronically signed by Jose Armando Perdomo APRN at 10/02/22 1030

## 2022-10-04 VITALS
HEART RATE: 80 BPM | TEMPERATURE: 98.3 F | BODY MASS INDEX: 24.02 KG/M2 | HEIGHT: 68 IN | RESPIRATION RATE: 16 BRPM | SYSTOLIC BLOOD PRESSURE: 118 MMHG | DIASTOLIC BLOOD PRESSURE: 73 MMHG

## 2022-10-04 PROCEDURE — G0008 ADMIN INFLUENZA VIRUS VAC: HCPCS | Performed by: OBSTETRICS & GYNECOLOGY

## 2022-10-04 PROCEDURE — 25010000002 INFLUENZA VAC SPLIT QUAD 0.5 ML SUSPENSION PREFILLED SYRINGE: Performed by: OBSTETRICS & GYNECOLOGY

## 2022-10-04 PROCEDURE — 90686 IIV4 VACC NO PRSV 0.5 ML IM: CPT | Performed by: OBSTETRICS & GYNECOLOGY

## 2022-10-04 PROCEDURE — 0503F POSTPARTUM CARE VISIT: CPT | Performed by: NURSE PRACTITIONER

## 2022-10-04 RX ADMIN — INFLUENZA VIRUS VACCINE 0.5 ML: 15; 15; 15; 15 SUSPENSION INTRAMUSCULAR at 12:00

## 2022-10-04 NOTE — PLAN OF CARE
Goal Outcome Evaluation:  Plan of Care Reviewed With: patient, spouse        Progress: improving  Outcome Evaluation: Vitals WNL. Pt continues to deny pain. She ambulates in room independently. Fundus is U/2, firm, and midline. Scant rubra present. Pt D/C'd home early afternoon.

## 2022-10-04 NOTE — DISCHARGE SUMMARY
Discharge Summary    Date of Admission: 10/2/2022  Date of Discharge:  10/4/2022      Patient: Nika Wynn      MR#:[id number]    Delivery Provider: Camilo Rosas     Attending Provider: Dahiana Landin MD    Presenting Problem/History of Present Illness  Normal labor [O80, Z37.9]     Patient Active Problem List   Diagnosis   • Mitral valve prolapse   • Mitral regurgitation   • Connective tissue disorder (HCC)   • Pregnancy   • Normal labor         Discharge Diagnosis: Vaginal delivery at 38w1d    Procedures:  Vaginal, Spontaneous     10/2/2022    7:37 AM        Discharge Date: 10/4/2022;     Hospital Course  Patient is a 27 y.o. female  at 38w1d status post vaginal delivery without complication. Postpartum the patient did well. She remained afebrile, with vital signs stable. She was ready for discharge on postpartum day 2.     Infant:   male  fetus 3255 g (7 lb 2.8 oz)  with Apgar scores of 9 , 9  at five minutes.    Condition on Discharge:  Stable    Vital Signs  Temp:  [98 °F (36.7 °C)-98.6 °F (37 °C)] 98.3 °F (36.8 °C)  Heart Rate:  [80-95] 80  Resp:  [16-20] 16  BP: (118-129)/(72-88) 118/73    Lab Results   Component Value Date    WBC 14.75 (H) 10/03/2022    HGB 12.9 10/03/2022    HCT 39.0 10/03/2022    MCV 94.4 10/03/2022     10/03/2022       Discharge Disposition  Home or Self Care    Discharge Medications     Discharge Medications      Continue These Medications      Instructions Start Date   PRENATAL VITAMINS PO   Oral             Discharge Diet:     Activity at Discharge:   Activity Instructions     Sexual Activity Restrictions      Type of Restriction: Sex    Explain Sexual Activity Restrictions: Pelvic rest for 6 weeks    Work Restrictions      Type of Restriction: Work    May Return to Work: After Next Appointment    With / Without Restrictions: Without Restrictions          Follow-up Appointments  Future Appointments   Date Time Provider Department Center   10/6/2022 11:15 AM  Jose Armando Perdomo APRN [facility]   10/13/2022  3:45 PM Jose Armando Perdomo APRN [facility]   12/7/2022 11:45 AM Ej Shipley, PT [facility]     Additional Instructions for the Follow-ups that You Need to Schedule     Call MD With Problems / Concerns   As directed      Instructions: Call for temp > 100.4, severe pain, severe bleeding, headache that does not improve with medication, blurred vision, or postpartum depression.    Order Comments: Instructions: Call for temp > 100.4, severe pain, severe bleeding, headache that does not improve with medication, blurred vision, or postpartum depression.          Discharge Follow-up with Specified Provider: Dr. Landin; 6 Weeks   As directed      To: Dr. Landin    Follow Up: 6 Weeks               CHICO Lopez  10/04/22  09:28 EDT  Csd

## 2022-10-04 NOTE — PAYOR COMM NOTE
"Nika Wynn (27 y.o. Female)             Date of Birth   1995    Social Security Number       Address   [address] [facility] [address]    Home Phone   [phone]    MRN   [id number]       Tenriism   Amish    Marital Status                               Admission Date   10/2/22    Admission Type   Elective    Admitting Provider   Dahiana Landin MD    Attending Provider       Department, Room/Bed   [facility] MOTHER BABY 4A, N420/1       Discharge Date   10/4/2022    Discharge Disposition   Home or Self Care    Discharge Destination                               Attending Provider: (none)   Allergies: No Known Allergies    Isolation: None   Infection: None   Code Status: CPR   Advance Care Planning Activity    Ht: 172.7 cm (68\")   Wt: 71.7 kg (158 lb)    Admission Cmt: None   Principal Problem: None                Active Insurance as of 10/2/2022     Primary Coverage     Payor Plan Insurance Group Employer/Plan Group    ANTHEM BLUE CROSS ANTHEM BLUE CROSS BLUE SHIELD PPO [id number]     Payor Plan Address Payor Plan Phone Number Payor Plan Fax Number Effective Dates    [address] [phone]  4/17/2022 - None Entered    [facility] [address]       Subscriber Name Subscriber Birth Date Member ID       JOESPH WYNN 5/11/1996 [id number]           Secondary Coverage     Payor Plan Insurance Group Employer/Plan Group    [place]      Payor Plan Address Payor Plan Phone Number Payor Plan Fax Number Effective Dates    [address]   1/22/2021 - None Entered    [facility] [address]       Subscriber Name Subscriber Birth Date Member ID       NIKA WYNN 1995 [id number]                 Emergency Contacts      (Rel.) Home Phone Work Phone Mobile Phone    joesph wynn (Spouse) [phone] -- [phone]            Insurance Information                ANTHEM BLUE CROSS/ANTHEM BLUE CROSS BLUE SHIELD PPO " Phone: [phone]    Subscriber: Vahid Wynn Subscriber#: [id number]    Group#: [id number] Precert#: --        [place] Phone: --    Subscriber: Nika Wynn Subscriber#: [id number]    Group#: -- Precert#: --             Discharge Summary      Jose Armando Perdomo APRN at 10/04/22 0955     Attestation signed by Dahiana Landin MD at 10/04/22 1331    I have reviewed this documentation and agree.                  Discharge Summary    Date of Admission: 10/2/2022  Date of Discharge:  10/4/2022      Patient: Nika Wynn      MR#:[id number]    Delivery Provider: Caimlo Rosas     Attending Provider: Dahiana Landin MD    Presenting Problem/History of Present Illness  Normal labor [O80, Z37.9]     Patient Active Problem List   Diagnosis   • Mitral valve prolapse   • Mitral regurgitation   • Connective tissue disorder (HCC)   • Pregnancy   • Normal labor         Discharge Diagnosis: Vaginal delivery at 38w1d    Procedures:  Vaginal, Spontaneous     10/2/2022    7:37 AM        Discharge Date: 10/4/2022;     Hospital Course  Patient is a 27 y.o. female  at 38w1d status post vaginal delivery without complication. Postpartum the patient did well. She remained afebrile, with vital signs stable. She was ready for discharge on postpartum day 2.     Infant:   male  fetus 3255 g (7 lb 2.8 oz)  with Apgar scores of 9 , 9  at five minutes.    Condition on Discharge:  Stable    Vital Signs  Temp:  [98 °F (36.7 °C)-98.6 °F (37 °C)] 98.3 °F (36.8 °C)  Heart Rate:  [80-95] 80  Resp:  [16-20] 16  BP: (118-129)/(72-88) 118/73    Lab Results   Component Value Date    WBC 14.75 (H) 10/03/2022    HGB 12.9 10/03/2022    HCT 39.0 10/03/2022    MCV 94.4 10/03/2022     10/03/2022       Discharge Disposition  Home or Self Care    Discharge Medications     Discharge Medications      Continue These Medications      Instructions Start Date   PRENATAL VITAMINS PO   Oral              Discharge Diet:     Activity at Discharge:   Activity Instructions     Sexual Activity Restrictions      Type of Restriction: Sex    Explain Sexual Activity Restrictions: Pelvic rest for 6 weeks    Work Restrictions      Type of Restriction: Work    May Return to Work: After Next Appointment    With / Without Restrictions: Without Restrictions          Follow-up Appointments  Future Appointments   Date Time Provider Department Center   10/6/2022 11:15 AM Jose Armando Perdomo APRN [facility]   10/13/2022  3:45 PM Jose Armando Perdomo APRN [facility]   12/7/2022 11:45 AM Ej Shipley, PT [facility]     Additional Instructions for the Follow-ups that You Need to Schedule     Call MD With Problems / Concerns   As directed      Instructions: Call for temp > 100.4, severe pain, severe bleeding, headache that does not improve with medication, blurred vision, or postpartum depression.    Order Comments: Instructions: Call for temp > 100.4, severe pain, severe bleeding, headache that does not improve with medication, blurred vision, or postpartum depression.          Discharge Follow-up with Specified Provider: Dr. Landin; 6 Weeks   As directed      To: Dr. Landin    Follow Up: 6 Weeks               CHICO Lopez  10/04/22  09:28 EDT  Csd    Electronically signed by Dahiana Landin MD at 10/04/22 7302

## 2022-10-04 NOTE — PAYOR COMM NOTE
"Nika Wynn (27 y.o. Female)             Date of Birth   1995    Social Security Number       Address   [address] [facility] [address]    Home Phone   [phone]    MRN   [id number]       Yazdanism   Nondenominational    Marital Status                               Admission Date   10/2/22    Admission Type   Elective    Admitting Provider   Dahiana Landin MD    Attending Provider       Department, Room/Bed   [facility] MOTHER BABY 4A, N420/1       Discharge Date   10/4/2022    Discharge Disposition   Home or Self Care    Discharge Destination                               Attending Provider: (none)   Allergies: No Known Allergies    Isolation: None   Infection: None   Code Status: CPR   Advance Care Planning Activity    Ht: 172.7 cm (68\")   Wt: 71.7 kg (158 lb)    Admission Cmt: None   Principal Problem: None                Active Insurance as of 10/2/2022     Primary Coverage     Payor Plan Insurance Group Employer/Plan Group    ANTHEM BLUE CROSS ANTHEM BLUE CROSS BLUE SHIELD PPO [id number]     Payor Plan Address Payor Plan Phone Number Payor Plan Fax Number Effective Dates    [address] [phone]  4/17/2022 - None Entered    [facility] [address]       Subscriber Name Subscriber Birth Date Member ID       JOESPH WYNN 5/11/1996 [id number]           Secondary Coverage     Payor Plan Insurance Group Employer/Plan Group    [place]      Payor Plan Address Payor Plan Phone Number Payor Plan Fax Number Effective Dates    [address]   1/22/2021 - None Entered    [facility] [address]       Subscriber Name Subscriber Birth Date Member ID       NIKA WYNN 1995 [id number]                 Emergency Contacts      (Rel.) Home Phone Work Phone Mobile Phone    joesph wynn (Spouse) [phone] -- [phone]            Insurance Information                ANTHEM BLUE CROSS/ANTHEM BLUE CROSS BLUE SHIELD PPO " Phone: [phone]    Subscriber: Vahid Wynn Subscriber#: [id number]    Group#: [id number] Precert#: --        [place] Phone: --    Subscriber: Nika Wynn Subscriber#: [id number]    Group#: -- Precert#: --             Discharge Summary      Jose Armando Perdomo APRN at 10/04/22 0942     Attestation signed by Dahiana Landin MD at 10/04/22 1331    I have reviewed this documentation and agree.                  Discharge Summary    Date of Admission: 10/2/2022  Date of Discharge:  10/4/2022      Patient: Nika Wynn      MR#:[id number]    Delivery Provider: Camilo Rosas     Attending Provider: Dahiana Landin MD    Presenting Problem/History of Present Illness  Normal labor [O80, Z37.9]     Patient Active Problem List   Diagnosis   • Mitral valve prolapse   • Mitral regurgitation   • Connective tissue disorder (HCC)   • Pregnancy   • Normal labor         Discharge Diagnosis: Vaginal delivery at 38w1d    Procedures:  Vaginal, Spontaneous     10/2/2022    7:37 AM        Discharge Date: 10/4/2022;     Hospital Course  Patient is a 27 y.o. female  at 38w1d status post vaginal delivery without complication. Postpartum the patient did well. She remained afebrile, with vital signs stable. She was ready for discharge on postpartum day 2.     Infant:   male  fetus 3255 g (7 lb 2.8 oz)  with Apgar scores of 9 , 9  at five minutes.    Condition on Discharge:  Stable    Vital Signs  Temp:  [98 °F (36.7 °C)-98.6 °F (37 °C)] 98.3 °F (36.8 °C)  Heart Rate:  [80-95] 80  Resp:  [16-20] 16  BP: (118-129)/(72-88) 118/73    Lab Results   Component Value Date    WBC 14.75 (H) 10/03/2022    HGB 12.9 10/03/2022    HCT 39.0 10/03/2022    MCV 94.4 10/03/2022     10/03/2022       Discharge Disposition  Home or Self Care    Discharge Medications     Discharge Medications      Continue These Medications      Instructions Start Date   PRENATAL VITAMINS PO   Oral              Discharge Diet:     Activity at Discharge:   Activity Instructions     Sexual Activity Restrictions      Type of Restriction: Sex    Explain Sexual Activity Restrictions: Pelvic rest for 6 weeks    Work Restrictions      Type of Restriction: Work    May Return to Work: After Next Appointment    With / Without Restrictions: Without Restrictions          Follow-up Appointments  Future Appointments   Date Time Provider Department Center   10/6/2022 11:15 AM Jose Armando Perdomo APRN [facility]   10/13/2022  3:45 PM Jose Armando Perdomo APRN [facility]   12/7/2022 11:45 AM Ej Shipley, PT [facility]     Additional Instructions for the Follow-ups that You Need to Schedule     Call MD With Problems / Concerns   As directed      Instructions: Call for temp > 100.4, severe pain, severe bleeding, headache that does not improve with medication, blurred vision, or postpartum depression.    Order Comments: Instructions: Call for temp > 100.4, severe pain, severe bleeding, headache that does not improve with medication, blurred vision, or postpartum depression.          Discharge Follow-up with Specified Provider: Dr. Landin; 6 Weeks   As directed      To: Dr. Landin    Follow Up: 6 Weeks               CHICO Lopez  10/04/22  09:28 EDT  Csd    Electronically signed by Dahiana Landin MD at 10/04/22 2829

## 2022-10-17 ENCOUNTER — TELEPHONE (OUTPATIENT)
Dept: OBSTETRICS AND GYNECOLOGY | Facility: CLINIC | Age: 27
End: 2022-10-17

## 2022-10-17 NOTE — TELEPHONE ENCOUNTER
Pt thinks she may have mastitis and would like to know if there is anything she can do at home to help this or if she needs to come in this week and be seen

## 2022-10-25 ENCOUNTER — POSTPARTUM VISIT (OUTPATIENT)
Dept: OBSTETRICS AND GYNECOLOGY | Facility: CLINIC | Age: 27
End: 2022-10-25

## 2022-10-25 VITALS
WEIGHT: 130.8 LBS | BODY MASS INDEX: 19.82 KG/M2 | DIASTOLIC BLOOD PRESSURE: 84 MMHG | HEIGHT: 68 IN | SYSTOLIC BLOOD PRESSURE: 112 MMHG

## 2022-10-25 DIAGNOSIS — R10.2 PELVIC PRESSURE IN FEMALE: Primary | ICD-10-CM

## 2022-10-25 PROCEDURE — 0503F POSTPARTUM CARE VISIT: CPT | Performed by: OBSTETRICS & GYNECOLOGY

## 2022-10-25 NOTE — PROGRESS NOTES
"      Chief Complaint   Patient presents with   • Postpartum Care       Postpartum problems visit       Nika Wynn is a 27 y.o.  who is s/p Vaginal, Spontaneous    Information for the patient's :  Angel Wynn [[id number]]   10/2/2022   male   Angel Wynn   3255 g (7 lb 2.8 oz)   Gestational Age: 38w1d      Baby Discharged: Discharged with Mom  Delivering Physician: Laborist     She presents today for possible uterine prolapse. Patient reports that her  was popping her back on 10/08/22 & when he lifted & squeezed her, she felt a pop. She states that she felt liked something shifted inside. Patient denies vaginal pain or pressure and doesn't feel any protrusion, but she can see something there with a mirror.     The patient complains of a small amount of bright red bleeding since then. Patient denies saturating more than a pad in an hour.    Patient describes bleeding as light.  Patient is breast feeding.  denies bowel or bladder issues.    Patient denies postpartum depression. Postpartum Depression Screening Questionnaire: 0, no treatment indicated.    The additional following portions of the patient's history were reviewed and updated as appropriate: allergies and current medications.      Review of Systems   Constitutional: Negative.    HENT: Negative.    Eyes: Negative.    Respiratory: Negative.    Cardiovascular: Negative.    Gastrointestinal: Negative.    Endocrine: Negative.    Genitourinary: Positive for vaginal bleeding.   Musculoskeletal: Negative.    Skin: Negative.    Allergic/Immunologic: Negative.    Neurological: Negative.    Hematological: Negative.    Psychiatric/Behavioral: Negative.        I have reviewed and agree with the HPI, ROS, and historical information as entered above. Dahiana Landin MD    Objective   /84   Ht 172.7 cm (68\")   Wt 59.3 kg (130 lb 12.8 oz)   LMP 2021   Breastfeeding Yes   BMI 19.89 kg/m²     Physical " Exam  Vitals and nursing note reviewed. Exam conducted with a chaperone present.   Constitutional:       Appearance: She is well-developed.   HENT:      Head: Normocephalic and atraumatic.   Eyes:      Conjunctiva/sclera: Conjunctivae normal.   Pulmonary:      Effort: Pulmonary effort is normal.   Abdominal:      Palpations: Abdomen is soft. Abdomen is not rigid.   Genitourinary:     Vagina: No lesions or prolapsed vaginal walls.      Cervix: No lesion or erythema.      Uterus: Enlarged. Not tender and no uterine prolapse.       Adnexa:         Right: No mass, tenderness or fullness.          Left: No mass, tenderness or fullness.     Musculoskeletal:      Cervical back: Normal range of motion.   Neurological:      Mental Status: She is alert and oriented to person, place, and time.   Psychiatric:         Mood and Affect: Mood normal.         Behavior: Behavior normal.              Assessment and Plan    Encounter Diagnosis   Name Primary?   • Pelvic pressure in female Yes     Plan    No evidence of significant pelvic prolapse seen today.  Encouraged to continue Kegels and keep f/u with PT.  She is breastfeeding.  Dahiana Landin MD  10/25/2022

## 2022-11-15 ENCOUNTER — POSTPARTUM VISIT (OUTPATIENT)
Dept: OBSTETRICS AND GYNECOLOGY | Facility: CLINIC | Age: 27
End: 2022-11-15

## 2022-11-15 VITALS — WEIGHT: 127.8 LBS | BODY MASS INDEX: 19.43 KG/M2 | SYSTOLIC BLOOD PRESSURE: 112 MMHG | DIASTOLIC BLOOD PRESSURE: 76 MMHG

## 2022-11-15 PROCEDURE — 0503F POSTPARTUM CARE VISIT: CPT | Performed by: OBSTETRICS & GYNECOLOGY

## 2022-11-15 NOTE — PROGRESS NOTES
Chief Complaint   Patient presents with   • Postpartum Care       6 Week Postpartum Visit         Nika Wynn is a 27 y.o.  who presents today for a 6 week postpartum check.       Vaginal, Spontaneous    Information for the patient's :  Angel Wynn [[id number]]   10/2/2022   male   Angel Wynn   3255 g (7 lb 2.8 oz)   Gestational Age: 38w1d          Baby Discharged: Discharged with Mom  Delivering Physician: - High     At the time of delivery were you diagnosed with any of the following: None. The laceration is healing well. Patient describes vaginal bleeding as absent.  Patient is breast feeding, and denies issues with her breast.  Patient would like to discuss options for contraception, unsure if she would like to start now.  Patient denies bowel or bladder issues.      Patient denies postpartum depression. Postpartum Depression Screening Questionnaire: Was completed and she scored a 0, no treatment indicated.      Last Completed Pap Smear          Ordered - PAP SMEAR (Every 3 Years) Ordered on 11/15/2022    2020  Done              Is the patient due for a pap today? Yes.  Performed Today    The additional following portions of the patient's history were reviewed and updated as appropriate: allergies, current medications, past family history, past medical history, past social history, past surgical history and problem list.    Review of Systems  All other systems reviewed and are negative.     I have reviewed and agree with the HPI, ROS, and historical information as entered above. Dahiana Landin MD    /76   Wt 58 kg (127 lb 12.8 oz)   LMP 2021   Breastfeeding Yes   BMI 19.43 kg/m²     Physical Exam  Vitals and nursing note reviewed. Exam conducted with a chaperone present.   Constitutional:       Appearance: She is well-developed.   HENT:      Head: Normocephalic and atraumatic.   Neck:      Thyroid: No thyroid mass or thyromegaly.    Pulmonary:      Breath sounds: No rhonchi.   Abdominal:      Palpations: Abdomen is soft. Abdomen is not rigid. There is no mass.      Tenderness: There is no abdominal tenderness. There is no guarding.      Hernia: No hernia is present.   Genitourinary:     Vagina: Normal.      Cervix: Normal.      Uterus: Normal.    Musculoskeletal:      Cervical back: Normal range of motion. No muscular tenderness.   Neurological:      Mental Status: She is alert and oriented to person, place, and time.   Psychiatric:         Behavior: Behavior normal.             Assessment and Plan    Problem List Items Addressed This Visit    None  Visit Diagnoses     Postpartum care following vaginal delivery    -  Primary    Relevant Orders    LIQUID-BASED PAP SMEAR, [facility] ([facility])          1. S/p Vaginal delivery, 6 weeks postpartum.  Doing well.    2. Return to normal physical activity.  No pelvic restrictions.   3. Baby doing well.  4. Breastfeeding going well.  5. No si/sx of postpartum depression  6. Contraception: contraceptive methods: None   7. SHe is planning on starting pelvic floor PT      Dahiana Landin MD  11/15/2022

## 2022-11-18 LAB — REF LAB TEST METHOD: NORMAL

## 2022-11-18 NOTE — PROGRESS NOTES
Pap was normal but with non 16 18 HPV.  Rec is to repeat in one year with high rish HPV testing again.  Call for any questions!  Have a great day.

## 2022-12-07 ENCOUNTER — TREATMENT (OUTPATIENT)
Dept: PHYSICAL THERAPY | Facility: CLINIC | Age: 27
End: 2022-12-07

## 2022-12-07 DIAGNOSIS — N81.89 PELVIC FLOOR WEAKNESS: Primary | ICD-10-CM

## 2022-12-07 DIAGNOSIS — M62.89 PELVIC FLOOR DYSFUNCTION: ICD-10-CM

## 2022-12-07 PROCEDURE — 97530 THERAPEUTIC ACTIVITIES: CPT | Performed by: PHYSICAL THERAPIST

## 2022-12-07 PROCEDURE — 97164 PT RE-EVAL EST PLAN CARE: CPT | Performed by: PHYSICAL THERAPIST

## 2022-12-07 PROCEDURE — 97110 THERAPEUTIC EXERCISES: CPT | Performed by: PHYSICAL THERAPIST

## 2022-12-07 NOTE — PROGRESS NOTES
Re-evaluation and Plan of Care  PHYSICAL THERAPY    [address], [address]; [place] [address]    Patient: Nika Wynn   : 1995  Diagnosis/ICD-10 Code:  Pelvic floor weakness [N81.89]  Referring practitioner: Dahiana Landin MD  Date of Initial Visit: 2022  Today's Date: 12/10/2022  Patient seen for 1 sessions      Subjective     Delivered 10/2/22  Labored at 12:15 and water broke around 2 went to hospital. Got to hospital around 3:30; around 7:30 delivery - pushed for just a few minutes. Minimal tearing. Not really having too many issues. Peed on herself once. Since then not really having leakage. BMs going good. One incident where 5 days postpartum where standing and  squeezed her to pop her back thought had prolapse but everything was okay.     Chief Complaint:   Chief Complaint   Patient presents with   • Initial Evaluation      Functional Outcome Measure: PFDI-20 score:8.3    Pain  Denies issues          Bowel function:  Denies issues    Sexual activity  Sexually active: not resumed      Prior PT or other treatment: prior PT for SPD    Diagnostics:    PMH:   Past Medical History:   Diagnosis Date   • Compression fracture of L2 vertebra (HCC)    • Connective tissue disorder (HCC)    • Mitral regurgitation    • Mitral valve prolapse         Surgical HX:   Past Surgical History:   Procedure Laterality Date   • NO PAST SURGERIES          Menstrual cycle: not resumed    Meds:   Current Outpatient Medications:   •  Prenatal Vit-Fe Fumarate-FA (PRENATAL VITAMINS PO), Take  by mouth., Disp: , Rfl:          Objective    Patient independently ambulates into clinic.     Verbal consent obtained for internal pelvic exam/treatment with declined need for second person in room  Posture: forward head and shuolders    Palpation:  Supine:   Abdominals: decreased activation  ALBERTO: unremarkable  Iliacus : unremarkable for tension B  Psoas :  unremarkable for tension B  Adductors unremarkable for  tension B      PELVIC FLOOR     Internal:   Sensation: intact B   Bulge: intact  Knack: weak  Wall Laxity: unremarkable     Internal superficial PFM: unremarkable for tension B   Levator ani: unremarkable for tension B       PERF SCALE:  Power: 2+    Endurance: 4    Repetitions: 4    Fast twitch: 5        See flowsheet for details of treatment following evaluation.    Basic information was provided regarding pelvic floor anatomy, explanation of the function of the pelvic floor, contribution to symptoms.     Assessment/Plan:     Assessment/Plan    The patient is a 27 y.o. female who presents to physical therapy today for pelvic floor weakness postpartum. Upon initial evaluation, the patient demonstrates the following impairments: decreased strength and endurance of abdominal and pelvic floor mm.. Due to these impairments, the patient has had urinary incontinence and would benefit from strengthening to address further issues. The patient would benefit from skilled pelvic PT services to address functional limitations and impairments and to improve patient quality of life.      Goals:   STG's: 4 weeks  · Patient will demonstrate 3/5 pfm strength for progress toward LTG  · Patient will report > 25% improvement in urinary symptoms  · Patient will be able to perform HEP with minimal verbal cues    LTG's: By discharge  · Patient will report no pelvic pressure x 3 weeks for improved pelvic health.   · Patient will report no incontinence x 1 month for improved QOL  · Patient will be independent with HEP      Plan  Therapy options: will be seen for skilled physical therapy services  Planned modality interventions: TENS, ultrasound, cryotherapy, thermotherapy (hydrocollator packs)  Planned therapy interventions: abdominal trunk stabilization, manual therapy, neuromuscular re-education, body mechanics training, flexibility, functional ROM exercises, gait training, home exercise program, joint mobilization, therapeutic  activities, stretching, strengthening, spinal/joint mobilization, soft tissue mobilization and postural training, dry needling  Pt prognosis: good  Frequency: weekly  Duration in visits: 7  Duration in weeks: 12  Treatment plan discussed with: patient    Treatment Time: 1115 - 1200  Timed:         Manual Therapy:    0     mins  80824;     Therapeutic Exercise:    23     mins  76813;     Neuromuscular Alexandre:    8    mins  75066;    Therapeutic Activity:     0     mins  45201;     Gait Trainin     mins  48708;     Ultrasound:     0     mins  61828;    Ionto                               0    mins   08895  Self Care                       0     mins   26788  Canalith Repos    0     mins 07891      Un-Timed:  Electrical Stimulation:    0     mins  86140 ( );  Dry Needling     0     mins self-pay  Traction     0     mins 32200  Low Eval     0     Mins  40438  Mod Eval     0     Mins  31713  High Eval                       0     Mins  48622  Re-Eval                           14    mins  23608        Timed Treatment:   31   mins   Total Treatment:     45   mins    PT SIGNATURE:   Ej Shipley PT   [place] License # [id number]    DATE TREATMENT INITIATED: 22    90 Day Recertification  Certification Period: 3/10/2023  I certify that the therapy services are furnished while this patient is under my care.  The services outlined above are required by this patient, and will be reviewed every 90 days.       PHYSICIAN: Dahiana Landin MD  NPI: [id number]                                           DATE: 2022     Please sign and return via fax to [phone]. Thank you, [facility].

## 2022-12-12 ENCOUNTER — TREATMENT (OUTPATIENT)
Dept: PHYSICAL THERAPY | Facility: CLINIC | Age: 27
End: 2022-12-12

## 2022-12-12 DIAGNOSIS — M62.89 PELVIC FLOOR DYSFUNCTION: ICD-10-CM

## 2022-12-12 DIAGNOSIS — N81.89 PELVIC FLOOR WEAKNESS: Primary | ICD-10-CM

## 2022-12-12 PROCEDURE — 97110 THERAPEUTIC EXERCISES: CPT | Performed by: PHYSICAL THERAPIST

## 2022-12-12 PROCEDURE — 97112 NEUROMUSCULAR REEDUCATION: CPT | Performed by: PHYSICAL THERAPIST

## 2022-12-12 NOTE — PROGRESS NOTES
Physical Therapy Daily Treatment Note  PHYSICAL THERAPY    [address] [facility], [address]; [place] [address]  Patient: Nika Wynn   : 1995  Diagnosis/ICD-10 Code:  Pelvic floor weakness [N81.89]  Referring practitioner: Dahiana Landin MD  Date of Initial Visit: Type: THERAPY  Noted: 2022  Today's Date: 2022  Patient seen for 2 sessions      Subjective   Nika Wynn reports doing HEP daily.   Pain Rating (0-10): 0      Objective   verbal consent obtained for external pelvic exam/treatment with declined need for second person in room     SEMG: supine 2.3, avg 5.6, max 11.7  See Exercise, Manual, and Modality Logs for complete treatment.       Assessment/Plan  Patient compliant with HEP and this was progressed today to include resistance in seated.  She was able to demonstrate PFM contraction without abdominal or glutes substitution.  Required verbal cueing to maintain longer holds and assist with normal breathing patterns.  We will continue to progress strengthening as indicated.    Progress per Plan of Care            Timed:         Manual Therapy:    0     mins  55481;     Therapeutic Exercise:    10     mins  03132;     Neuromuscular Alexandre:    31    mins  02404;    Therapeutic Activity:     0     mins  60710;     Gait Trainin     mins  65510;     Ultrasound:     0     mins  98802;    Ionto                               0    mins   98591  Self Care                       0     mins   96607  Canalith Repos               0    mins  40856    Un-Timed:  Electrical Stimulation:    0     mins  49053 (MC );  Dry Needling     0     mins self-pay  Traction     0     mins 36116  Low Eval     0     Mins  16660  Mod Eval     0     Mins  64015  High Eval                       0     Mins  40918  Re-Eval                           0    mins  62312    Timed Treatment:   41   mins   Total Treatment:     41   mins    Ej Shipley PT  [place] License # [id number]  Physical Therapist

## 2022-12-19 ENCOUNTER — TREATMENT (OUTPATIENT)
Dept: PHYSICAL THERAPY | Facility: CLINIC | Age: 27
End: 2022-12-19

## 2022-12-19 DIAGNOSIS — N81.89 PELVIC FLOOR WEAKNESS: Primary | ICD-10-CM

## 2022-12-19 DIAGNOSIS — M62.89 PELVIC FLOOR DYSFUNCTION: ICD-10-CM

## 2022-12-19 DIAGNOSIS — M62.81 MUSCLE WEAKNESS: ICD-10-CM

## 2022-12-19 PROCEDURE — 97112 NEUROMUSCULAR REEDUCATION: CPT | Performed by: PHYSICAL THERAPIST

## 2022-12-19 PROCEDURE — 97110 THERAPEUTIC EXERCISES: CPT | Performed by: PHYSICAL THERAPIST

## 2022-12-19 NOTE — PROGRESS NOTES
Physical Therapy Daily Treatment Note  PHYSICAL THERAPY    [address] [facility], [address]; [place] [address]  Patient: Nika Wynn   : 1995  Diagnosis/ICD-10 Code:  Pelvic floor weakness [N81.89]  Referring practitioner: Dahiana Landin MD  Date of Initial Visit: Type: THERAPY  Noted: 2022  Today's Date: 2022  Patient seen for 3 sessions      Subjective   Nika Wynn reports doing HEP. Feels she is able to hold longer.     Pain Rating (0-10): 0      Objective   verbal consent obtained for external pelvic exam/treatment with declined need for second person in room     See Exercise, Manual, and Modality Logs for complete treatment.       Assessment/Plan  Patient compliant with HEP and demonstrates improving recruitment of PFM with SEMG today.  HEP progressed to further core stabilization.  Will continue to progress strengthening as indicated to improve overall pelvic floor strength and stability.    Progress per Plan of Care         1032/1115   Timed:         Manual Therapy:    0     mins  99958;     Therapeutic Exercise:    20     mins  88191;     Neuromuscular Alexandre:    23    mins  21678;    Therapeutic Activity:     0     mins  25681;     Gait Trainin     mins  21633;     Ultrasound:     0     mins  44620;    Ionto                               0    mins   71446  Self Care                       0     mins   00149  Canalith Repos               0    mins  14644    Un-Timed:  Electrical Stimulation:    0     mins  19795 ( );  Dry Needling     0     mins self-pay  Traction     0     mins 31614  Low Eval     0     Mins  12363  Mod Eval     0     Mins  75679  High Eval                       00     Mins  89217  Re-Eval                           0    mins  83167    Timed Treatment:   43   mins   Total Treatment:     43   mins    Ej Shipley PT  [place] License # [id number]  Physical Therapist

## 2022-12-30 ENCOUNTER — TELEPHONE (OUTPATIENT)
Dept: OBSTETRICS AND GYNECOLOGY | Facility: CLINIC | Age: 27
End: 2022-12-30
Payer: COMMERCIAL

## 2022-12-30 NOTE — TELEPHONE ENCOUNTER
Pt calling to ask about rx for arrowflow for breast pump order. They told pt that the form has been sent here and she wants to know if it has been completed.

## 2023-01-06 ENCOUNTER — TREATMENT (OUTPATIENT)
Dept: PHYSICAL THERAPY | Facility: CLINIC | Age: 28
End: 2023-01-06
Payer: COMMERCIAL

## 2023-01-06 DIAGNOSIS — M62.89 PELVIC FLOOR DYSFUNCTION: ICD-10-CM

## 2023-01-06 DIAGNOSIS — M62.81 MUSCLE WEAKNESS: ICD-10-CM

## 2023-01-06 DIAGNOSIS — N81.89 PELVIC FLOOR WEAKNESS: Primary | ICD-10-CM

## 2023-01-06 PROCEDURE — 97112 NEUROMUSCULAR REEDUCATION: CPT | Performed by: PHYSICAL THERAPIST

## 2023-01-06 PROCEDURE — 97110 THERAPEUTIC EXERCISES: CPT | Performed by: PHYSICAL THERAPIST

## 2023-01-06 NOTE — PROGRESS NOTES
Physical Therapy Daily Treatment Note  PHYSICAL THERAPY    [address] [facility], [address]; [place] [address]  Patient: Nika Wynn   : 1995  Diagnosis/ICD-10 Code:  Pelvic floor weakness [N81.89]  Referring practitioner: Dahiana Landin MD  Date of Initial Visit: Type: THERAPY  Noted: 2022  Today's Date: 2023  Patient seen for 4 sessions      Subjective   Nika Wynn reports she is doing exercises some. Feels core is getting stronger.   Pain Rating (0-10): 0      Objective   verbal consent obtained for external pelvic exam/treatment with declined need for second person in room     SEMG: supine 2.3, avg 6.2, max 11.7  Sitting: rest 1.3, avg 5.4, max 8.7    See Exercise, Manual, and Modality Logs for complete treatment.         Assessment/Plan  Pt partially compliant with HEP over the holidays. She has minimal symptoms and feels improving core strength overall. HEP progressed to include resistance in standing today. Will continue to progress as tolerated to improve strength and stability.     Progress per Plan of Care         1117/1155   Timed:         Manual Therapy:    0     mins  00988;     Therapeutic Exercise:    8     mins  63911;     Neuromuscular Alexandre:    30    mins  27443;    Therapeutic Activity:     0     mins  70481;     Gait Trainin     mins  17921;     Ultrasound:     0     mins  28591;    Ionto                               0    mins   52189  Self Care                       0     mins   90624  Canalith Repos               0    mins  86599    Un-Timed:  Electrical Stimulation:    0     mins  99058 ( );  Dry Needling     0     mins self-pay  Traction     0     mins 45415  Low Eval     0     Mins  36859  Mod Eval     0     Mins  64076  High Eval                       0     Mins  54561  Re-Eval                           0    mins  25307    Timed Treatment:   38   mins   Total Treatment:     38   mins    Ej Shipley, PT  [place] License # [id number]  Physical  Therapist

## 2023-01-13 ENCOUNTER — TREATMENT (OUTPATIENT)
Dept: PHYSICAL THERAPY | Facility: CLINIC | Age: 28
End: 2023-01-13
Payer: COMMERCIAL

## 2023-01-13 DIAGNOSIS — N81.89 PELVIC FLOOR WEAKNESS: Primary | ICD-10-CM

## 2023-01-13 DIAGNOSIS — M62.81 MUSCLE WEAKNESS: ICD-10-CM

## 2023-01-13 DIAGNOSIS — M62.89 PELVIC FLOOR DYSFUNCTION: ICD-10-CM

## 2023-01-13 PROCEDURE — 97110 THERAPEUTIC EXERCISES: CPT | Performed by: PHYSICAL THERAPIST

## 2023-01-13 NOTE — PROGRESS NOTES
Re-Assessment / Progress Note  PHYSICAL THERAPY    [address] [facility], [address]; [place] [address]      Patient: Nika Wynn   : 1995  Diagnosis/ICD-10 Code:  Pelvic floor weakness [N81.89]  Referring practitioner: Dahiana Landin MD  Date of Initial Visit: Type: THERAPY  Noted: 2022  Today's Date: 2023  Patient seen for 5 sessions      Subjective:   Nika Wynn reports: she is doing well and feels ready to progress with abdominal strengthening.      Subjective Questionnaire: PFDI 20  Clinical Progress: improved  Home Program Compliance: Yes  Treatment has included: therapeutic exercise, neuromuscular re-education and therapeutic activity      Objective   verbal consent obtained for external pelvic exam/treatment with declined need for second person in room   See flowsheet for details of treatment following reassessment.   Assessment/Plan  Progress to physical therapy goals is good.She reports improved strength and bladder symptoms. She has met 2/3 short term goals and 1/3 long term goals. She will benefit from continued skilled physical therapy to address remaining impairments and functional limitations.   Progress toward previous goals: Partially Met    STG's: 4 weeks  • Patient will demonstrate 3/5 pfm strength for progress toward LTG- in progress  • Patient will report > 25% improvement in urinary symptoms- met  • Patient will be able to perform HEP with minimal verbal cues - met     LTG's: By discharge  • Patient will report no pelvic pressure x 3 weeks for improved pelvic health. - met  • Patient will report no incontinence x 1 month for improved QOL  • Patient will be independent with HEP      Recommendations: Continue as planned  Timeframe: 6 weeks  Prognosis to achieve goals: good    PT Signature: Ej Shipley PT      Based upon review of the patient's progress and continued therapy plan, it is my medical opinion that Nika Wynn should continue physical therapy treatment at  [facility] PHYSICAL THERAPY  [address] [facility] [address] [phone].    Signature: __________________________________    PHYSICIAN: Dahaina Landin MD  NPI: [id number]                                         1119/1200  Manual Therapy:    0     mins  07098;  Therapeutic Exercise:    39     mins  45480;     Neuromuscular Alexandre:    0    mins  21566;    Therapeutic Activity:     0     mins  64243;     Gait Trainin     mins  06060;     Ultrasound:     0     mins  07025;    Electrical Stimulation:    0     mins  72950 ( );  Dry Needling     0     mins self-pay    Timed Treatment:   39   mins   Total Treatment:     41   mins

## 2023-01-27 ENCOUNTER — TELEPHONE (OUTPATIENT)
Dept: PHYSICAL THERAPY | Facility: CLINIC | Age: 28
End: 2023-01-27

## 2023-02-27 ENCOUNTER — TELEPHONE (OUTPATIENT)
Dept: PHYSICAL THERAPY | Facility: CLINIC | Age: 28
End: 2023-02-27

## 2023-02-27 NOTE — TELEPHONE ENCOUNTER
Caller:     Relationship:     WANTED TO WAIT A LITTLE WHILE TO COME BACK TO GIVE THERAPIST WHO WAS SICK TIME TO BE WELL BEFORE COMING BACK

## 2023-03-07 ENCOUNTER — TREATMENT (OUTPATIENT)
Dept: PHYSICAL THERAPY | Facility: CLINIC | Age: 28
End: 2023-03-07
Payer: COMMERCIAL

## 2023-03-07 DIAGNOSIS — M62.81 MUSCLE WEAKNESS: ICD-10-CM

## 2023-03-07 DIAGNOSIS — M62.89 PELVIC FLOOR DYSFUNCTION: ICD-10-CM

## 2023-03-07 DIAGNOSIS — N81.89 PELVIC FLOOR WEAKNESS: Primary | ICD-10-CM

## 2023-03-07 PROCEDURE — 97110 THERAPEUTIC EXERCISES: CPT | Performed by: PHYSICAL THERAPIST

## 2023-03-07 NOTE — PROGRESS NOTES
Discharge Summary  PHYSICAL THERAPY    [address] [facility], [address]; [place] [address]           Patient: Nika Wynn   : 1995  Diagnosis/ICD-10 Code:  Pelvic floor weakness [N81.89]  Referring practitioner: Dahiana Landin MD  Date of Initial Visit: Type: THERAPY  Noted: 2022  Today's Date: 3/10/2023  Patient seen for 6 sessions      Subjective:   Nika Wynn reports: feels like she can return to exercise without issue. Stamina feels like it has improved.   Subjective Questionnaire: PFDI 20  Clinical Progress: improved  Home Program Compliance: Yes  Treatment has included: therapeutic exercise, neuromuscular re-education, manual therapy and therapeutic activity      Objective   verbal consent obtained for internal pelvic exam/treatment with declined need for second person in room   Superficial pfm and levator ani unremarkable for tension B  PERF: 3/  See flowsheet for details of treatment following reassessment.     Assessment/Plan  Progress to physical therapy goals is good.She reports improved ability to exercise with pelvic and abdominal strength and support. She has met 3/3 short term goals and 4/4 long term goals. She is appropriate for discharge at this time and has been instructed in maintenance HEP.   Progress toward previous goals: All Met    Goals:   STG's: 4 weeks  • Patient will demonstrate 3/5 pfm strength for progress toward LTG - met  • Patient will report > 25% improvement in urinary symptoms - met  • Patient will be able to perform HEP with minimal verbal cues - met     LTG's: By discharge  • Patient will report no pelvic pressure x 3 weeks for improved pelvic health. met  • Patient will report no incontinence x 1 month for improved QOL - met  • Patient will be independent with HEP - met      Recommendations: Discharge      PT Signature: Ej Shipley PT      Based upon review of the patient's progress and continued therapy plan, it is my medical opinion that Nika  Livers should discharge from physical therapy treatment [facility] [address] [phone].    Signature: __________________________________  Dahiana Landin MD    1530/1600  Manual Therapy:    0     mins  42379;  Therapeutic Exercise:    25     mins  84690;     Neuromuscular Alexandre:    0    mins  58026;    Therapeutic Activity:     0     mins  25348;     Gait Trainin     mins  83527;     Ultrasound:     0     mins  57414;    Electrical Stimulation:    0     mins  88362 ( );  Dry Needling     0     mins self-pay    Timed Treatment:   25   mins   Total Treatment:     25   mins

## 2023-05-19 NOTE — PROGRESS NOTES
Visit #: 4    Subjective   Nika Lin reports: no longer having pain with bringing leg forward, had one instance of back pain while sitting in her truck.     Objective   See Exercise, Manual, and Modality Logs for complete treatment.       Assessment/Plan   Educated today on SL deadlift, cues for back posture, difficulty with hip stability in right leg stance.   Will continue progressing hip strength, balance, and core stability. Will likely see into next week and consider discharge with HEP that will progress through her pregnancy.  Progress per Plan of Care           Manual Therapy:         mins  66040;  Therapeutic Exercise:    30     mins  88927;     Neuromuscular Alexandre:    25    mins  10241;    Therapeutic Activity:          mins  89923;     Gait Training:           mins  57901;     Ultrasound:          mins  91574;   Iontophoresis         mins  98069   Electrical Stimulation:         mins  78675 ( );  Dry Needling         mins self-pay  Fluidotherapy          mins 33820    Timed Treatment:   55   mins   Total Treatment:     55   mins    Khadra Phan, PT  Physical Therapist  
Home

## 2023-06-05 NOTE — TELEPHONE ENCOUNTER
Dr. Landin pt.     S/w pt she states she thinks she is having some Pubic synthesis Dysfunction issues because she is having pelvic pain and states that it hurts to walk and when putting on her pants and requests a referral to physical therapy if Dr. Landin thinks this is appropriate. I told patient I would discuss this with Dr. Landin and CB with plan of care.    Patient also states : she bought a belly band from [employer] , but has not used it yet and wanted to get Dr. Landin recommendations on that as well.     Patient has H/O connective tissue disease as well.    O-T Advancement Flap Text: The defect edges were debeveled with a #15 scalpel blade. Given the location of the defect, shape of the defect and the proximity to free margins an O-T advancement flap was deemed most appropriate. Using a sterile surgical marker, an appropriate advancement flap was drawn incorporating the defect and placing the expected incisions within the relaxed skin tension lines where possible. The area thus outlined was incised deep to adipose tissue with a #15 scalpel blade. The skin margins were undermined to an appropriate distance in all directions utilizing iris scissors. Following this, the designed flap was advanced and carried over into the primary defect and sutured into place.

## 2023-11-09 ENCOUNTER — TELEPHONE (OUTPATIENT)
Dept: OBSTETRICS AND GYNECOLOGY | Facility: CLINIC | Age: 28
End: 2023-11-09
Payer: COMMERCIAL

## 2023-11-09 NOTE — TELEPHONE ENCOUNTER
Called patient back she stated she has an appointment with a GP today but wanted to make sure if she got rx for abx that it was safe for breastfeeding. Told her to send a message if that occurs and we will call her back.

## 2023-11-09 NOTE — TELEPHONE ENCOUNTER
Provider: DR DHALIWAL     Caller: RADHA GRANADO    Relationship to Patient: SELF    Pharmacy: [facility]    Phone Number: [phone]    Reason for Call: PT IS HAVING SYMPTOMS OF A UTI (URGENCY, FULL BLADDER FEELING,DISCOMFORT) AND WOULD LIKE TO BE SEEN TODAY PLEASE CALL PT TO ADVISE     When was the patient last seen: 9/2022

## 2023-11-21 ENCOUNTER — OFFICE VISIT (OUTPATIENT)
Dept: OBSTETRICS AND GYNECOLOGY | Facility: CLINIC | Age: 28
End: 2023-11-21
Payer: COMMERCIAL

## 2023-11-21 VITALS
DIASTOLIC BLOOD PRESSURE: 62 MMHG | SYSTOLIC BLOOD PRESSURE: 108 MMHG | WEIGHT: 119.4 LBS | HEIGHT: 68 IN | BODY MASS INDEX: 18.1 KG/M2

## 2023-11-21 DIAGNOSIS — Z23 NEED FOR HPV VACCINE: ICD-10-CM

## 2023-11-21 DIAGNOSIS — Z80.3 FAMILY HISTORY OF BREAST CANCER: ICD-10-CM

## 2023-11-21 DIAGNOSIS — Z01.419 WOMEN'S ANNUAL ROUTINE GYNECOLOGICAL EXAMINATION: ICD-10-CM

## 2023-11-21 DIAGNOSIS — B97.7 HPV IN FEMALE: Primary | ICD-10-CM

## 2023-11-21 DIAGNOSIS — Z12.39 ENCOUNTER FOR BREAST CANCER SCREENING USING NON-MAMMOGRAM MODALITY: ICD-10-CM

## 2023-11-21 PROBLEM — Z37.9 NORMAL LABOR: Status: RESOLVED | Noted: 2022-10-02 | Resolved: 2023-11-21

## 2023-11-21 PROBLEM — Z34.90 PREGNANCY: Status: RESOLVED | Noted: 2022-08-23 | Resolved: 2023-11-21

## 2023-11-21 RX ORDER — CEPHALEXIN 500 MG/1
500 CAPSULE ORAL
COMMUNITY
Start: 2023-11-09 | End: 2023-11-27

## 2023-11-21 NOTE — PROGRESS NOTES
Gynecologic Annual Exam Note        Gynecologic Exam        Subjective     HPI  Nika Wynn is a 28 y.o.  female who presents for annual well woman exam as a established patient. There were no changes to her medical or surgical history since her last visit.. Patient reports problems with:  Patient states that she is currently being treated for a UTI by her PCP. Patient is on day 2 of Keflex.   Patient's last menstrual period was 10/30/2023. Her periods occur every 25-35 days, lasting 5-7 days. The flow is moderate. She reports dysmenorrhea is none. Partner Status: Marital Status: . She is sexually active. She has not had new partners. STD testing recommendations have been explained to the patient and she does not desire STD testing.    Patient states that her half-sister was recently diagnosed with breast CA at 36. Patient states that she and her sister have the same mother. Patient states that her sister underwent genetic testing and was negative. Patient states that her sister started chemotherapy today. Patient states that her sister will undergo a lumpectomy.     Additional OB/GYN History   Current contraception: contraceptive methods: Withdrawal and Tracking ovulation  Desires to: do not start contraception  Thromboembolic Disease: none  Age of menarche: 18    History of STD: no    Last Pap : 11/15/2022. Results: negative. HPV: HPV pool +.   Last Completed Pap Smear            Ordered - PAP SMEAR (Every 3 Years) Ordered on 2023      11/15/2022  LIQUID-BASED PAP SMEAR, [facility] ([facility])    2020  Done                     History of abnormal Pap smear: yes - Negative, HPV pool+ in   Gardasil status: No . Patient states that she is open to getting her HPV vaccines.   Family history of uterine, colon, breast, or ovarian cancer: yes - Sister and MGGM- breast CA  Performs monthly Self-Breast Exam: yes  Exercises Regularly:no  Feelings of Anxiety or Depression:  no  Tobacco Usage?: No       Current Outpatient Medications:     cephalexin (KEFLEX) 500 MG capsule, Take 1 capsule by mouth., Disp: , Rfl:     Prenatal Vit-Fe Fumarate-FA (PRENATAL VITAMINS PO), Take  by mouth., Disp: , Rfl:     Current Facility-Administered Medications:     HPV 9-Valent Recomb Vaccine suspension 0.5 mL, 0.5 mL, Intramuscular, Once, Dahiana Landin MD     Patient denies the need for medication refills today.    OB History          1    Para   1    Term   1       0    AB   0    Living   1         SAB   0    IAB   0    Ectopic   0    Molar   0    Multiple   0    Live Births   1                Health Maintenance   Topic Date Due    Annual Gynecologic Pelvic and Breast Exam  Never done    HPV VACCINES (1 - 2-dose series) Never done    ANNUAL PHYSICAL  Never done    BMI FOLLOWUP  2023    INFLUENZA VACCINE  2023    COVID-19 Vaccine (4 - -24 season) 2023    PAP SMEAR  11/15/2025    TDAP/TD VACCINES (4 - Td or Tdap) 2032    HEPATITIS C SCREENING  Completed    Pneumococcal Vaccine 0-64  Aged Out    CHLAMYDIA SCREENING  Discontinued       Past Medical History:   Diagnosis Date    Compression fracture of L2 vertebra     Connective tissue disorder     Mitral regurgitation     Mitral valve prolapse         Past Surgical History:   Procedure Laterality Date    NO PAST SURGERIES         The additional following portions of the patient's history were reviewed and updated as appropriate: allergies, current medications, past family history, past medical history, past social history, past surgical history, and problem list.    Review of Systems   Constitutional: Negative.    HENT: Negative.     Eyes: Negative.    Respiratory: Negative.     Cardiovascular: Negative.    Gastrointestinal: Negative.    Endocrine: Negative.    Genitourinary: Negative.    Musculoskeletal: Negative.    Skin: Negative.    Allergic/Immunologic: Negative.    Neurological: Negative.    Hematological:  "Negative.    Psychiatric/Behavioral: Negative.           I have reviewed and agree with the HPI, ROS, and historical information as entered above. Dahiana Landin MD          Objective   /62   Ht 172.7 cm (68\")   Wt 54.2 kg (119 lb 6.4 oz)   LMP 10/30/2023   Breastfeeding Yes   BMI 18.15 kg/m²     Physical Exam  Vitals and nursing note reviewed. Exam conducted with a chaperone present.   Constitutional:       Appearance: She is well-developed.   HENT:      Head: Normocephalic and atraumatic.   Neck:      Thyroid: No thyroid mass or thyromegaly.   Cardiovascular:      Rate and Rhythm: Normal rate and regular rhythm.      Heart sounds: No murmur heard.  Pulmonary:      Effort: Pulmonary effort is normal. No retractions.      Breath sounds: Normal breath sounds. No wheezing, rhonchi or rales.   Chest:      Chest wall: No mass or tenderness.   Breasts:     Right: Normal. No mass, nipple discharge, skin change or tenderness.      Left: Normal. No mass, nipple discharge, skin change or tenderness.   Abdominal:      General: Bowel sounds are normal.      Palpations: Abdomen is soft. Abdomen is not rigid. There is no mass.      Tenderness: There is no abdominal tenderness. There is no guarding.      Hernia: No hernia is present. There is no hernia in the left inguinal area.   Genitourinary:     Labia:         Right: No rash, tenderness or lesion.         Left: No rash, tenderness or lesion.       Vagina: Normal. No vaginal discharge or lesions.      Cervix: No cervical motion tenderness, discharge, lesion or cervical bleeding.      Uterus: Normal. Not enlarged, not fixed and not tender.       Adnexa:         Right: No mass or tenderness.          Left: No mass or tenderness.        Rectum: No external hemorrhoid.   Musculoskeletal:      Cervical back: Normal range of motion. No muscular tenderness.   Neurological:      Mental Status: She is alert and oriented to person, place, and time.   Psychiatric:         " Behavior: Behavior normal.            Assessment and Plan    Problem List Items Addressed This Visit    None  Visit Diagnoses       HPV in female    -  Primary    Relevant Medications    HPV 9-Valent Recomb Vaccine suspension 0.5 mL (Start on 11/21/2023  3:00 PM)    Other Relevant Orders    LIQUID-BASED PAP SMEAR WITH HPV GENOTYPING REGARDLESS OF INTERPRETATION ([facility])    Women's annual routine gynecological examination        Relevant Orders    LIQUID-BASED PAP SMEAR WITH HPV GENOTYPING REGARDLESS OF INTERPRETATION ([facility])    Need for HPV vaccine        Relevant Medications    HPV 9-Valent Recomb Vaccine suspension 0.5 mL (Start on 11/21/2023  3:00 PM)    Encounter for breast cancer screening using non-mammogram modality        Family history of breast cancer        Relevant Orders    Ambulatory Referral to Genetic Counseling/Testing            GYN annual well woman exam.   Reviewed pap guidelines.   Recommended use of Vitamin D replacement and getting adequate calcium in her diet. (1500mg)  Reviewed monthly self breast exams.  Instructed to call with lumps, pain, or breast discharge.    Reviewed HPV guidelines.  Reviewed exercise as a preventative health measures.    Family h/o breast cancer sister age 36.  Genetic counseling and start imaging when able.  Currently breastfeeding.  CBE neg.  HPV vaccine series started today.  Repeat in 2 and 6 months.  No follow-ups on file.      Dahiana Landin MD  11/21/2023

## 2023-11-27 LAB — REF LAB TEST METHOD: NORMAL

## 2023-12-01 ENCOUNTER — TELEPHONE (OUTPATIENT)
Dept: OBSTETRICS AND GYNECOLOGY | Facility: CLINIC | Age: 28
End: 2023-12-01
Payer: COMMERCIAL

## 2023-12-01 NOTE — TELEPHONE ENCOUNTER
LOS pt.     Pap LSIL with HPV Pool +. Needs Colpo    SW pt. Results reviewed. Colpo scheduled. Pt instructed to leave urine. Pt VU.

## 2023-12-07 ENCOUNTER — PROCEDURE VISIT (OUTPATIENT)
Dept: OBSTETRICS AND GYNECOLOGY | Facility: CLINIC | Age: 28
End: 2023-12-07
Payer: COMMERCIAL

## 2023-12-07 VITALS
WEIGHT: 115.2 LBS | SYSTOLIC BLOOD PRESSURE: 120 MMHG | BODY MASS INDEX: 17.46 KG/M2 | DIASTOLIC BLOOD PRESSURE: 82 MMHG | HEIGHT: 68 IN

## 2023-12-07 DIAGNOSIS — Z76.89 ENCOUNTER FOR BIOPSY: Primary | ICD-10-CM

## 2023-12-07 DIAGNOSIS — B97.7 HPV (HUMAN PAPILLOMA VIRUS) INFECTION: ICD-10-CM

## 2023-12-07 LAB
B-HCG UR QL: NEGATIVE
EXPIRATION DATE: NORMAL
INTERNAL NEGATIVE CONTROL: NORMAL
INTERNAL POSITIVE CONTROL: NORMAL
Lab: NORMAL

## 2023-12-07 NOTE — PROGRESS NOTES
"     Colposcopy Procedure Note        Procedures    Indications: Nika Wynn is a 28 y.o. female, , whose Patient's last menstrual period was 2023 (exact date)..  She presents for follow up for evaluation of an abnormal PAP smear that showed low-grade squamous intraepithelial neoplasia (LGSIL - encompassing HPV,mild dysplasia,VIKY I). Prior cervical treatment includes: no treatment. She understands the need for the procedure and is aware of the complications, including post-colposcopic vaginal bleeding, vaginal leukorrhea or cervicitis.  She is aware she may experience discomfort.  After being presented with the risk, benefits, and alternatives the patient wished to proceed.      Urine pregnancy test done in the office today was Negative.      The patient has had Gardasil.  She is not a smoker.      Procedure Details   The risks and benefits of the procedure and Verbal informed consent obtained. Time out: immediate members of the procedure team and patient agree to the following: correct patient, correct site, correct procedure to be performed. Dahiana Landin MD      She was positioned in the dorsal lithotomy position and a speculum was inserted into the vagina and excellent visualization of cervix achieved, cervix swabbed x 3 with acetic acid solution. The transformation zone was completely visualized.  A cervical biopsy was obtained at 9 o'clock.  An endocervical curettage was performed.  This colposcopy was satisfactory.     Findings:  The procedure was notable for:  Physical Exam  /82   Ht 172.7 cm (68\")   Wt 52.3 kg (115 lb 3.2 oz)   LMP 2023 (Exact Date)   Breastfeeding Yes   BMI 17.52 kg/m²       Specimens:   Specimens labelled and sent to Pathology.    Complications: none.    The patient tolerated the procedure very well and with mild discomfort and bleeding.    Assessment and Plan    Problem List Items Addressed This Visit    None  Visit Diagnoses       Encounter for " biopsy    -  Primary    Relevant Orders    POC Pregnancy, Urine (Completed)    HPV (human papilloma virus) infection                Will base further treatment on Pathology findings.  Treatment options discussed with patient.  Post biopsy instructions given to patient.      Dahiana Landin MD  12/07/2023

## 2023-12-11 LAB — REF LAB TEST METHOD: NORMAL

## 2024-01-22 ENCOUNTER — CLINICAL SUPPORT (OUTPATIENT)
Dept: OBSTETRICS AND GYNECOLOGY | Facility: CLINIC | Age: 29
End: 2024-01-22
Payer: COMMERCIAL

## 2024-01-22 VITALS — WEIGHT: 119 LBS | BODY MASS INDEX: 18.04 KG/M2 | HEIGHT: 68 IN

## 2024-01-22 DIAGNOSIS — B97.7 HPV IN FEMALE: Primary | ICD-10-CM

## 2024-01-22 DIAGNOSIS — Z23 ENCOUNTER FOR ADMINISTRATION OF VACCINE: ICD-10-CM

## 2024-01-22 PROCEDURE — 90651 9VHPV VACCINE 2/3 DOSE IM: CPT | Performed by: OBSTETRICS & GYNECOLOGY

## 2024-01-22 PROCEDURE — 90471 IMMUNIZATION ADMIN: CPT | Performed by: OBSTETRICS & GYNECOLOGY

## 2024-01-22 NOTE — PROGRESS NOTES
HPV Injection Note  Nika Wynn is a 28 y.o. female here for counseling on HPV vaccination. Her LMP was 1/15/2024.     She has received and reviewed the Gardasil Information Sheet. Contraindications have been reviewed.    Information Reviewed  Hamida Kinsey RN reviewed the possible side effects of pain, swelling, itching at the injection site, fever, difficulty breathing and anaphylactic reactions. The visit involved 15 minutes of counseling on STD prevention and vaccine benefits.    Injection Details  Noted in the Immunization Activity.    Future Injections  Her next injection is in 4 months: 5/22/2024.     Toleration  Patient tolerated the injection well with no problems.    Electronically Signed By:  Hamida Kinsey RN  01/22/2024

## 2024-03-04 ENCOUNTER — TELEPHONE (OUTPATIENT)
Dept: OBSTETRICS AND GYNECOLOGY | Facility: CLINIC | Age: 29
End: 2024-03-04
Payer: COMMERCIAL

## 2024-03-04 NOTE — TELEPHONE ENCOUNTER
Pt is calling regarding her colposcopy which was clear. Pt has had abnormal periods for last 2 months and is currently having brown discharge with tissue in discharge.

## 2024-03-04 NOTE — TELEPHONE ENCOUNTER
Dr. Landin pt.     S/w pt she states since her colposcopy that was done on 12/7/23 with biopsies done as well she has been having irregular menses.     Patient states she had a menses in January after coming home from [place] and then she had ovulation pains and about 2 days after that she started her menses again on 1/14-1/19 then she started menses again on 2/5 and is still having vaginal spotting that is mostly brown in color and sometimes light pink in color. Patient states that vaginal spotting is intermittent and mild    Patient denies: saturating 1 pad <1 hour, vaginal blood clots bigger than golf ball in size, severe pelvic pain/cramping    I advised patient to continue monitoring her menses/vaginal spotting for this month and that her body is probably just trying to get back to normal after colposcopy with biopsies and if her s/s do not resolve or improve or if they worsen to CB for further evaluation and go to ED. She v/u

## 2024-03-22 ENCOUNTER — TELEPHONE (OUTPATIENT)
Dept: OBSTETRICS AND GYNECOLOGY | Facility: CLINIC | Age: 29
End: 2024-03-22
Payer: COMMERCIAL

## 2024-03-22 NOTE — TELEPHONE ENCOUNTER
PATIENT IS IN HER SECOND PREGNANCY, SHE STATES SHE HAD TO GET RHOGAM WITH HER FIRST PREGNANCY AND WANTED TO SEE WHEN SHE WOULD NEED TO GET IT WITH THIS PREGNANCY     PLEASE ADVISE

## 2024-03-22 NOTE — TELEPHONE ENCOUNTER
Pt is approx 3-4 weeks and she wanted to know when she needs to get rhogam. I let her know if she is not having any bleeding ( she denied having any) she will get it at 28 weeks but to call if she has bleeding. She VU

## 2024-04-17 ENCOUNTER — INITIAL PRENATAL (OUTPATIENT)
Dept: OBSTETRICS AND GYNECOLOGY | Facility: CLINIC | Age: 29
End: 2024-04-17
Payer: COMMERCIAL

## 2024-04-17 VITALS — WEIGHT: 122.8 LBS | DIASTOLIC BLOOD PRESSURE: 62 MMHG | SYSTOLIC BLOOD PRESSURE: 100 MMHG | BODY MASS INDEX: 18.67 KG/M2

## 2024-04-17 DIAGNOSIS — I34.0 NONRHEUMATIC MITRAL VALVE REGURGITATION: ICD-10-CM

## 2024-04-17 DIAGNOSIS — M35.9 CONNECTIVE TISSUE DISORDER: ICD-10-CM

## 2024-04-17 DIAGNOSIS — O26.849 FETAL SIZE INCONSISTENT WITH DATES: ICD-10-CM

## 2024-04-17 DIAGNOSIS — I34.1 MITRAL VALVE PROLAPSE: ICD-10-CM

## 2024-04-17 DIAGNOSIS — Z82.79 FAMILY HISTORY OF CONGENITAL HEART DEFECT: ICD-10-CM

## 2024-04-17 DIAGNOSIS — Z36.9 ENCOUNTER FOR ANTENATAL SCREENING: Primary | ICD-10-CM

## 2024-04-17 PROCEDURE — 0501F PRENATAL FLOW SHEET: CPT | Performed by: OBSTETRICS & GYNECOLOGY

## 2024-04-17 NOTE — PROGRESS NOTES
Initial ob visit     CC- Here for care of pregnancy        Nika Wynn is a 28 y.o. female, , who presents for her first obstetrical visit. Patient's last menstrual period was 2024.. Her SHUN is 2024, by Ultrasound. Current GA is 7w3d.     Initial positive test date : 2024, UPT        Her periods are every 26-29 days. Patient states that she ovulates later in her cycle. Patient states that she ovulated on 2024 that would've place her with an SHUN of 2024.   Prior obstetric issues: G1 had severe CHD that required open heart surgery at 6 months of age.  Patient's past medical history is significant for:  MVP and Mitral regurgitation. Patient follows with [facility]. Patient most recent echocardiogram in 2023. Patient previously had an echocardiogram each trimester with G1. Patient states that her most recent echocardiogram was $500+, but was free during pregnancy. Patient will contact their office to notification of pregnancy. Patient states that she does not want to have an echocardiogram during pregnancy if not covered in full unless absolutely necessary .  Family history of genetic issues (includes FOB): G1 had severe CHD that required open heart surgery at 6 months of age. Patient father and FOB mother with CHD that did not require intervention.   Prior infections concerning in pregnancy (Rash, fever in last 2 weeks): No  Varicella Hx - history of chicken pox  Prior testing for Cystic Fibrosis Carrier or Sickle Cell Trait- No  Prepregnancy BMI - Body mass index is 18.67 kg/m².  History of STD: No  Hx of HSV for patient or partner:  No  Ultrasound Today: Yes. SIUP measuring 7w3d with FHR: 157bpm.     OB History    Para Term  AB Living   2 1 1 0 0 1   SAB IAB Ectopic Molar Multiple Live Births   0 0 0 0 0 1      # Outcome Date GA Lbr Wyatt/2nd Weight Sex Type Anes PTL Lv   2 Current            1 Term 10/02/22 38w1d 07:10 / 00:27 3255 g (7 lb 2.8 oz) M  Vag-Spont Local N RON       Additional Pertinent History   Last Pap : 11/21/2023 Result: LSIL HPV: HPV pool +. Colposcopy on 12/07/2023: LGSIL.  Last Completed Pap Smear            PAP SMEAR (Every 3 Years) Next due on 11/21/2026 11/21/2023  LIQUID-BASED PAP SMEAR WITH HPV GENOTYPING REGARDLESS OF INTERPRETATION ([facility])    11/15/2022  LIQUID-BASED PAP SMEAR, [facility] ([facility])    01/01/2020  Done                  History of abnormal Pap smear:  Yes  Family history of uterine, colon, breast, or ovarian cancer: yes - Sister and MGGM- breast CA  Feelings of Anxiety or Depression: no  Tobacco Usage?: No   Alcohol/Drug Use?: No  Over the age of 35 at delivery: no  Genetic Screening: desires cell free DNA      PMH    Current Outpatient Medications:     Prenatal Vit-Fe Fumarate-FA (PRENATAL VITAMINS PO), Take  by mouth., Disp: , Rfl:      Past Medical History:   Diagnosis Date    Abnormal ECG     Abnormal Pap smear of cervix 2023    Compression fracture of L2 vertebra     Connective tissue disorder     HPV (human papilloma virus) infection 2023    Mitral regurgitation     Mitral valve prolapse     Rh incompatibility 2022    Varicella 2001        Past Surgical History:   Procedure Laterality Date    NO PAST SURGERIES         Review of Systems   Review of Systems    Patient Reports:  no complaints  Patient Denies:excessive nausea , excessive vomiting, and vaginal bleeding  All systems reviewed and otherwise normal.    I have reviewed and agree with the HPI, ROS, and historical information as entered above. Dahiana Landin MD      /62   Wt 55.7 kg (122 lb 12.8 oz)   LMP 02/05/2024   BMI 18.67 kg/m²     The additional following portions of the patient's history were reviewed and updated as appropriate: allergies, current medications, past family history, past medical history, past social history, past surgical history, and problem list.    Physical Exam  General:  well developed; well nourished  no  acute distress   Chest/Respiratory: No labored breathing, normal respiratory effort, normal appearance, no respiratory noises noted   Heart:  normal rate, regular rhythm,  no murmurs, rubs, or gallops   Thyroid: normal to inspection and palpation   Breasts:  Not performed.   Abdomen: soft, non-tender; no masses  no umbilical or inguinal hernias are present  no hepato-splenomegaly   Pelvis: Not performed.        Assessment and Plan    Problem List Items Addressed This Visit          Cardiac and Vasculature    Mitral valve prolapse    Overview     Maternal echo each trimester         Mitral regurgitation    Overview     Anatomy scan at [facility]  Fetal echo at 24 weeks at [facility]            Family History    Family history of congenital heart defect    Overview     G1 with CHD requiring open heart surgery at 6 months of age  Anatomy scan at [facility]  Fetal echo at 24 weeks at [facility]            Multi-system (Lupus, Sarcoid...)    Connective tissue disorder     Other Visit Diagnoses       Encounter for  screening    -  Primary    Relevant Orders    Obstetric Panel    HIV-1 / O / 2 Ag / Antibody    Urine Culture - Urine, Urine, Clean Catch    Urinalysis With Microscopic - Urine, Clean Catch    Chlamydia trachomatis, Neisseria gonorrhoeae, PCR - Urine, Urine, Clean Catch    Urine Drug Screen - Urine, Clean Catch            Pregnancy at 7w3d  Reviewed routine prenatal care with the office and educational materials given  Discussed options for genetic testing including first trimester nuchal translucency screen, genetic disease carrier testing, quadruple screen, and NIPT  CHD in first child requiring surgery at 6 months.  [facility] scans and fetal echo at  potentially  Size less than dates.  U/S two weeks for viability.  Labs at that time  Return in about 2 weeks (around 2024) for Ultrasound.      Dahiana Landin MD  2024

## 2024-05-01 ENCOUNTER — ROUTINE PRENATAL (OUTPATIENT)
Dept: OBSTETRICS AND GYNECOLOGY | Facility: CLINIC | Age: 29
End: 2024-05-01
Payer: COMMERCIAL

## 2024-05-01 VITALS — SYSTOLIC BLOOD PRESSURE: 124 MMHG | DIASTOLIC BLOOD PRESSURE: 62 MMHG | WEIGHT: 123 LBS | BODY MASS INDEX: 18.7 KG/M2

## 2024-05-01 DIAGNOSIS — Z3A.09 9 WEEKS GESTATION OF PREGNANCY: Primary | ICD-10-CM

## 2024-05-01 DIAGNOSIS — I34.1 MITRAL VALVE PROLAPSE: ICD-10-CM

## 2024-05-01 DIAGNOSIS — Z34.81 MULTIGRAVIDA IN FIRST TRIMESTER: ICD-10-CM

## 2024-05-01 DIAGNOSIS — Z82.79 FAMILY HISTORY OF CONGENITAL HEART DEFECT: ICD-10-CM

## 2024-05-01 DIAGNOSIS — I34.0 MITRAL VALVE INSUFFICIENCY, UNSPECIFIED ETIOLOGY: ICD-10-CM

## 2024-05-01 LAB
GLUCOSE UR STRIP-MCNC: NEGATIVE MG/DL
PROT UR STRIP-MCNC: NEGATIVE MG/DL

## 2024-05-01 NOTE — PROGRESS NOTES
OB FOLLOW UP  CC- Here for care of pregnancy        Nika Wynn is a 28 y.o.  9w3d patient being seen today for her obstetrical follow up visit. Patient reports back pain (Hx DDD) and severe fatigue. Patient states she is still breastfeeding.     Her prenatal care is complicated by (and status) :   Patient Active Problem List   Diagnosis    Mitral valve prolapse    Mitral regurgitation    Connective tissue disorder    Family history of congenital heart defect       Genetic testing?: desires with gender.  NOB labs drawn today  Flu Status: Declines  Ultrasound Today: Yes. , CRL 27.3mm, 9w4d/79%    ROS -   Patient Denies: leaking of fluid, vaginal bleeding, dysuria, excessive vomiting, and more than 6 contractions per hour  All other systems reviewed and are negative.     The additional following portions of the patient's history were reviewed and updated as appropriate: allergies, current medications, past family history, past medical history, past social history, past surgical history, and problem list.    I have reviewed and agree with the HPI, ROS, and historical information as entered above. Shannon Heaton, APRN      /62   Wt 55.8 kg (123 lb)   LMP 2024   BMI 18.70 kg/m²         EXAM:     Prenatal Vitals  BP: 124/62  Weight: 55.8 kg (123 lb)   Fetal Heart Rate: 181          Urine Glucose Read-only: Negative  Urine Protein Read-only: Negative       Assessment and Plan    Problem List Items Addressed This Visit       Family history of congenital heart defect    Overview     G1 with CHD requiring open heart surgery at 6 months of age  Anatomy scan at [facility]  Fetal echo at 24 weeks at [facility]         Mitral regurgitation    Overview     Anatomy scan at [facility]  Fetal echo at 24 weeks at [facility]         Mitral valve prolapse    Overview     Maternal echo each trimester          Other Visit Diagnoses       9 weeks gestation of pregnancy    -  Primary    Multigravida in first trimester         Relevant Orders    POC Urinalysis Dipstick (Completed)    [id number] PLUS Core+SCA+ESS - Blood,            Pregnancy at 9w3d  Labs reviewed from New OB Visit.  Counseled on genetic testing, carrier status and option for NT screen. Ordered cfDNA.  Activity and Exercise discussed.  Tylenol sparingly and heating pad to back only.   Patient is on Prenatal vitamins  Discussed/encouraged social distancing/COVID19 precautions; encouraged vaccination when able  Return in about 4 weeks (around 5/29/2024) for Urvashi Cullen.    Shannon Heaton, APRN  05/01/2024

## 2024-05-02 LAB
ABO GROUP BLD: NORMAL
AMPHETAMINES UR QL SCN: NEGATIVE NG/ML
APPEARANCE UR: ABNORMAL
BACTERIA #/AREA URNS HPF: ABNORMAL /[HPF]
BARBITURATES UR QL SCN: NEGATIVE NG/ML
BASOPHILS # BLD AUTO: 0 X10E3/UL (ref 0–0.2)
BASOPHILS NFR BLD AUTO: 1 %
BENZODIAZ UR QL SCN: NEGATIVE NG/ML
BILIRUB UR QL STRIP: NEGATIVE
BLD GP AB SCN SERPL QL: NEGATIVE
BZE UR QL SCN: NEGATIVE NG/ML
CANNABINOIDS UR QL SCN: NEGATIVE NG/ML
CASTS URNS QL MICRO: ABNORMAL /LPF
COLOR UR: YELLOW
CREAT UR-MCNC: 79.5 MG/DL (ref 20–300)
CRYSTALS URNS MICRO: ABNORMAL
EOSINOPHIL # BLD AUTO: 0.1 X10E3/UL (ref 0–0.4)
EOSINOPHIL NFR BLD AUTO: 1 %
EPI CELLS #/AREA URNS HPF: ABNORMAL /HPF (ref 0–10)
ERYTHROCYTE [DISTWIDTH] IN BLOOD BY AUTOMATED COUNT: 13 % (ref 11.7–15.4)
GLUCOSE UR QL STRIP: NEGATIVE
HBV SURFACE AG SERPL QL IA: NEGATIVE
HCT VFR BLD AUTO: 42.1 % (ref 34–46.6)
HCV IGG SERPL QL IA: NON REACTIVE
HGB BLD-MCNC: 14.1 G/DL (ref 11.1–15.9)
HGB UR QL STRIP: NEGATIVE
HIV 1+2 AB+HIV1 P24 AG SERPL QL IA: NON REACTIVE
IMM GRANULOCYTES # BLD AUTO: 0 X10E3/UL (ref 0–0.1)
IMM GRANULOCYTES NFR BLD AUTO: 1 %
KETONES UR QL STRIP: NEGATIVE
LABORATORY COMMENT REPORT: NORMAL
LEUKOCYTE ESTERASE UR QL STRIP: NEGATIVE
LYMPHOCYTES # BLD AUTO: 0.7 X10E3/UL (ref 0.7–3.1)
LYMPHOCYTES NFR BLD AUTO: 13 %
MCH RBC QN AUTO: 30.2 PG (ref 26.6–33)
MCHC RBC AUTO-ENTMCNC: 33.5 G/DL (ref 31.5–35.7)
MCV RBC AUTO: 90 FL (ref 79–97)
METHADONE UR QL SCN: NEGATIVE NG/ML
MICRO URNS: ABNORMAL
MICRO URNS: ABNORMAL
MONOCYTES # BLD AUTO: 0.5 X10E3/UL (ref 0.1–0.9)
MONOCYTES NFR BLD AUTO: 9 %
NEUTROPHILS # BLD AUTO: 4.2 X10E3/UL (ref 1.4–7)
NEUTROPHILS NFR BLD AUTO: 75 %
NITRITE UR QL STRIP: NEGATIVE
OPIATES UR QL SCN: NEGATIVE NG/ML
OXYCODONE+OXYMORPHONE UR QL SCN: NEGATIVE NG/ML
PCP UR QL: NEGATIVE NG/ML
PH UR STRIP: 6.5 [PH] (ref 5–7.5)
PH UR: 6.5 [PH] (ref 4.5–8.9)
PLATELET # BLD AUTO: 186 X10E3/UL (ref 150–450)
PROPOXYPH UR QL SCN: NEGATIVE NG/ML
PROT UR QL STRIP: NEGATIVE
RBC # BLD AUTO: 4.67 X10E6/UL (ref 3.77–5.28)
RBC #/AREA URNS HPF: ABNORMAL /HPF (ref 0–2)
RH BLD: NEGATIVE
RPR SER QL: NON REACTIVE
RUBV IGG SERPL IA-ACNC: 8.06 INDEX
SP GR UR STRIP: 1.02 (ref 1–1.03)
UNIDENT CRYS URNS QL MICRO: PRESENT
UROBILINOGEN UR STRIP-MCNC: 0.2 MG/DL (ref 0.2–1)
WBC # BLD AUTO: 5.5 X10E3/UL (ref 3.4–10.8)
WBC #/AREA URNS HPF: ABNORMAL /HPF (ref 0–5)

## 2024-05-03 LAB
BACTERIA UR CULT: NO GROWTH
BACTERIA UR CULT: NORMAL
C TRACH RRNA SPEC QL NAA+PROBE: NEGATIVE
N GONORRHOEA RRNA SPEC QL NAA+PROBE: NEGATIVE

## 2024-05-06 ENCOUNTER — TELEPHONE (OUTPATIENT)
Dept: OBSTETRICS AND GYNECOLOGY | Facility: CLINIC | Age: 29
End: 2024-05-06
Payer: COMMERCIAL

## 2024-05-30 ENCOUNTER — ROUTINE PRENATAL (OUTPATIENT)
Dept: OBSTETRICS AND GYNECOLOGY | Facility: CLINIC | Age: 29
End: 2024-05-30
Payer: COMMERCIAL

## 2024-05-30 VITALS — WEIGHT: 127.2 LBS | BODY MASS INDEX: 19.34 KG/M2 | DIASTOLIC BLOOD PRESSURE: 72 MMHG | SYSTOLIC BLOOD PRESSURE: 118 MMHG

## 2024-05-30 DIAGNOSIS — Z34.91 PRENATAL CARE IN FIRST TRIMESTER: Primary | ICD-10-CM

## 2024-05-30 DIAGNOSIS — Z82.79 FAMILY HISTORY OF CONGENITAL HEART DEFECT: ICD-10-CM

## 2024-05-30 DIAGNOSIS — M35.9 CONNECTIVE TISSUE DISORDER: ICD-10-CM

## 2024-05-30 DIAGNOSIS — I34.1 MITRAL VALVE PROLAPSE: ICD-10-CM

## 2024-05-30 LAB
GLUCOSE UR STRIP-MCNC: NEGATIVE MG/DL
PROT UR STRIP-MCNC: NEGATIVE MG/DL

## 2024-05-30 NOTE — PROGRESS NOTES
OB FOLLOW UP  CC- Here for care of pregnancy        Nika Wynn is a 28 y.o.  13w4d patient being seen today for her obstetrical follow up visit. Patient reports weakness in the AM, fatigue, heart palpitations, and occasional headaches. Patient was seen by  for weakness in the AM and heart palpitations. Patient was given a holter monitor to wear for 1 week and has not gotten the results of those yet. Labs revealed a low ALT and TSH was normal. Patient holter monitor showed rare PACs and PVCs, but was otherwise unremarkable. PVC burden was 0.88%. Previous PVC burned in  was 1.05%.     Her prenatal care is complicated by (and status) :   Patient Active Problem List   Diagnosis    Mitral valve prolapse    Mitral regurgitation    Connective tissue disorder    Family history of congenital heart defect       Genetic testing?: already completed and was normal.  NOB labs reviewed  Ultrasound Today: No    ROS -   Patient Denies: leaking of fluid, vaginal bleeding, dysuria, excessive vomiting, and more than 6 contractions per hour  All other systems reviewed and are negative.     The additional following portions of the patient's history were reviewed and updated as appropriate: allergies and current medications.    I have reviewed and agree with the HPI, ROS, and historical information as entered above. Dahiana Landin MD          /72   Wt 57.7 kg (127 lb 3.2 oz)   LMP 2024   BMI 19.34 kg/m²         EXAM:     Prenatal Vitals  BP: 118/72  Weight: 57.7 kg (127 lb 3.2 oz)   Fetal Heart Rate: pos          Urine Glucose Read-only: Negative  Urine Protein Read-only: Negative       Assessment and Plan    Problem List Items Addressed This Visit          Cardiac and Vasculature    Mitral valve prolapse    Overview     Maternal echo each trimester            Family History    Family history of congenital heart defect    Overview     G1 with CHD requiring open heart surgery at 6 months of  age  Anatomy scan at [facility]  Fetal echo at 24 weeks at [facility]            Multi-system (Lupus, Sarcoid...)    Connective tissue disorder     Other Visit Diagnoses       Prenatal care in first trimester    -  Primary    Relevant Orders    POC Urinalysis Dipstick (Completed)            Pregnancy at 13w4d  Labs reviewed from New OB Visit.  Counseled on genetic testing, carrier status and option for NT screen  Activity and Exercise discussed.  Patient is on Prenatal vitamins  Return in about 3 weeks (around 6/20/2024).    Dahiana Landin MD  05/30/2024

## 2024-06-20 ENCOUNTER — ROUTINE PRENATAL (OUTPATIENT)
Dept: OBSTETRICS AND GYNECOLOGY | Facility: CLINIC | Age: 29
End: 2024-06-20
Payer: COMMERCIAL

## 2024-06-20 VITALS — DIASTOLIC BLOOD PRESSURE: 62 MMHG | BODY MASS INDEX: 19.98 KG/M2 | WEIGHT: 131.4 LBS | SYSTOLIC BLOOD PRESSURE: 102 MMHG

## 2024-06-20 DIAGNOSIS — M35.9 CONNECTIVE TISSUE DISORDER: ICD-10-CM

## 2024-06-20 DIAGNOSIS — Z34.92 PRENATAL CARE IN SECOND TRIMESTER: ICD-10-CM

## 2024-06-20 DIAGNOSIS — I34.1 MITRAL VALVE PROLAPSE: ICD-10-CM

## 2024-06-20 DIAGNOSIS — Z82.79 FAMILY HISTORY OF CONGENITAL HEART DEFECT: ICD-10-CM

## 2024-06-20 DIAGNOSIS — Z36.89 ENCOUNTER FOR FETAL ANATOMIC SURVEY: Primary | ICD-10-CM

## 2024-06-20 LAB
GLUCOSE UR STRIP-MCNC: NEGATIVE MG/DL
PROT UR STRIP-MCNC: NEGATIVE MG/DL

## 2024-06-20 NOTE — PROGRESS NOTES
OB FOLLOW UP  CC- Here for care of pregnancy        Nika Wynn is a 29 y.o.  16w4d patient being seen today for her obstetrical follow up visit. Patient reports no complaints.    Her prenatal care is complicated by (and status) : see below.  Patient Active Problem List   Diagnosis    Mitral valve prolapse    Mitral regurgitation    Connective tissue disorder    Family history of congenital heart defect       Ultrasound Today: No    AFP: declines    ROS -   Patient Denies: leaking of fluid, vaginal bleeding, dysuria, excessive vomiting, and more than 6 contractions per hour  All other systems reviewed and are negative.       The additional following portions of the patient's history were reviewed and updated as appropriate: allergies and current medications.      I have reviewed and agree with the HPI, ROS, and historical information as entered above. Dahiana Landin MD          EXAM:     Prenatal Vitals  BP: 102/62  Weight: 59.6 kg (131 lb 6.4 oz)   Fetal Heart Rate: pos         Urine Glucose Read-only: Negative  Urine Protein Read-only: Negative           Assessment and Plan    Problem List Items Addressed This Visit          Cardiac and Vasculature    Mitral valve prolapse    Overview     Maternal echo each trimester            Family History    Family history of congenital heart defect    Overview     G1 with CHD requiring open heart surgery at 6 months of age  Anatomy scan at [facility]  Fetal echo at 24 weeks at [facility]         Relevant Orders    POC Urinalysis Dipstick (Completed)    [facility] [place]       Multi-system (Lupus, Sarcoid...)    Connective tissue disorder     Other Visit Diagnoses       Encounter for fetal anatomic survey    -  Primary    Relevant Orders    POC Urinalysis Dipstick (Completed)    [facility] [place]    Prenatal care in second trimester        Relevant Orders    POC Urinalysis Dipstick (Completed)            Pregnancy at 16w4d  Fetal  status reassuring.   Counseled on MSAFP alone in relation to OTD and placental issues.    Anatomy scan next visit.   Activity and Exercise discussed.  Patient is on Prenatal vitamins  Return in about 4 weeks (around 7/18/2024), or PDC scan.    Dahiana Landin MD  06/20/2024

## 2024-06-25 ENCOUNTER — CLINICAL SUPPORT (OUTPATIENT)
Dept: GENETICS | Facility: HOSPITAL | Age: 29
End: 2024-06-25
Payer: COMMERCIAL

## 2024-06-25 DIAGNOSIS — Z80.3 FAMILY HISTORY OF MALIGNANT NEOPLASM OF BREAST: Primary | ICD-10-CM

## 2024-06-25 PROCEDURE — 96040: CPT

## 2024-06-25 NOTE — PROGRESS NOTES
Date of Visit: 2024   Patient Name: Nika Wynn  : 1995   MRN: [id number]     Referring Provider: Dahiana Landin MD     REASON FOR CONSULT  Nika Wynn is a 29 y.o. female referred for genetic counseling due to a family history of breast cancer.  Ms. Wynn does not have a personal history of cancer. She reached menarche at age 18 and had her son at age 27.  She is premenopausal and has never used HRT.  She is currently pregnant.  She is reported to begin mammograms soon due to her family history of early onset breast cancer.  Ms. Wynn was interested in discussing her risk for a hereditary cancer syndrome and genetic testing for cancer susceptibility.  However, Ms. Wynn wished to postpone hereditary cancer genetic testing until after she is done having children.  Her estimated lifetime breast cancer risk is 12.1% as calculated by the Tyrer-Cuzick model.      PERTINENT FAMILY HISTORY:  A cancer-focused four-generation pedigree was obtained via patient reporting.    Maternal Half-Sister - breast cancer, 36  Maternal Grandmother - lymphoma, late 60's  Maternal Great Grandmother (mother of MGRADHA) - breast cancer, 50's    RISK ASSESSMENT  Ms. Wynn's reported family history is suggestive of a hereditary cancer risk.  Ms. Wynn meets NCCN guidelines criteria for genetic testing based on her half-sister was diagnosed with breast cancer before the age of 50.  A significant proportion (>50%) of hereditary breast and ovarian cancer can be attributed to mutations in the BRCA1 and BRCA2 genes.  Females with mutations in BRCA1/2  can have a lifetime breast cancer risk up to 60 - 84%, while the general population risk for breast cancer is 12 - 13%.  BRCA1 mutations can also increase the lifetime risk for ovarian cancer up to 58% and BRCA2 mutations can increase this risk up to 29%.  The general population lifetime ovarian cancer risk is 1 - 2%.  BRCA1/2 mutations can also significantly increase  the lifetime risk of certain cancers in males such as prostate cancer.  Mutations in these two genes can also increase the risk for pancreatic cancer and male breast cancer.  There are other clinically significant cancer related genes, as well as other, more rare, hereditary cancer syndromes than can increase risk for breast and ovarian cancers. The degree of risk and screening recommendations vary by gene, the individual's age, and related family history.    The Tyrer-Cuzick model (DWAIN) is a validated female breast cancer risk tool that assesses an unaffected (has not had breast cancer) female's lifetime breast cancer risk.  Tyrer-Cuzick version 8.0b was used to calculate Ms. Wynn's lifetime breast cancer risk.  Ms. Wynn's estimated lifetime breast cancer risk is 12.1%.  This model predicts the average 27 y.o. unaffected female's lifetime risk of breast cancer to be 11.2%.  The National Comprehensive Cancer Network guidelines (version 2.2024) recommend the following for 27 y.o. females who have an less than a 20% lifetime risk of breast cancer calculated from validated models such as the Tyrer-Cuzick model:    Clinical breast exam every 1-3 until age 40, then annually after age 40  Annual mammogram with tomosynthesis starting at age 40  Practice breast awareness    It should be noted that Ms. Wynn maternal half sister was diagnosed with breast cancer at age 36.  This family history of early onset breast cancer could warrant the start of breast cancer screenings earlier than previously described NCCN guidelines.  The appropriate age Ms. Wynn should start breast cancer screenings and at what frequency should be determined by her provider considering her family history of breast cancer and her own intrinsic risk factors.      GENETIC COUNSELING  We reviewed the family history information in detail. Cases of cancer follow three general patterns: sporadic, familial, and hereditary.  While most cancer cases are  "sporadic (~70% of cancer cases), some cases appear to occur in family clusters.  These cases are said to be familial and account for around 20% of cancer cases.  Familial cases may be due to a combination of shared genes and environmental factors among family members that we cannot evaluate via genetic testing at this time.  In 5% - 10% of cancer cases, the risk for cancer is inherited, and the genes responsible for the increased cancer risk are known.      Family histories typical of hereditary cancer syndromes usually include multiple first- and second-degree relatives diagnosed with cancer types that define a syndrome.  These cases tend to be diagnosed at younger-than-expected ages and can be bilateral or multifocal.  The cancer in these families follows an autosomal dominant inheritance pattern, which indicates the likely presence of a mutation in a cancer gene.  Children and siblings of an individual carrying a mutation have a 50% chance of inheriting that mutation, thereby inheriting the increased risk to develop cancer.  However, it is not recommended to pursue genetic testing for adult-onset cancers in children as the results would not have clinical utility until some time in adulthood among other ethical reasons.  These mutations can be passed down from the maternal or the paternal lineage.    We discussed that risk factors or \"red flags\" for hereditary risk include cancers diagnosed at earlier-than-average ages, multiple family members diagnosed with cancers associated with mutations in the same gene, or multiple generations of associated cancers. Dependent on the specific cancer type or syndrome, there exists the potential for several clinically significant genes related to the cancer's onset or increased risk for development.  Therefore, the standard approach to hereditary cancer genetic testing at this time is via multi-gene panel analyzing several genes associated with a hereditary risk for cancer.  " Once results are completed, we will review them in the context of the patient's personal and family history to determine what, if any, post-test cancer risk management changes are recommended.  When affected relatives are unavailable or unwilling to pursue genetic testing, it is reasonable to offer genetic testing to an unaffected individual.    GENETIC TESTING  The risks, benefits, and limitations of genetic testing and implications for clinical management following testing were reviewed.  Genetic test results can influence decisions regarding screening and prevention.  Genetic testing can have significant psychological implications for both individuals and families. Also discussed was the possibility of insurance discrimination based on genetic test results and the laws in place to prevent this, as well as the limitations of these laws.      The Genetic Information Nondiscrimination Act (JASON) is a federal law enacted in May 2008.  JASON prohibits discrimination on the basis of genetic information such as genetic test results with respect to health insurance and employment status.  Under Title 1 of JASON, health insurance companies are prohibited from using an individual's genetic information to change premiums, drop or change an individual's coverage, or prevent coverage from being acquired.  Title 2 of JASON prohibits employers from using genetic information in employment decisions such as, but not limited to, hiring, firing, promotions, pay, and job assignments.  JASON protections do not protect against genetic discrimination by life insurance, long-term care or disability insurance,  service members, federal employees, those using the [facility], or those employed by a small business with fewer than 15 employees.    We discussed panel testing, which could involve testing numerous genes associated with increased cancer risk. The implications of a positive,negative, or VUS test result were  "discussed.  We also discussed the importance of testing an affected relative. In general, a negative genetic test result is most informative if a mutation has first been established in an affected member of the family.  In cases where an affected individual is not available or interested in testing, it is appropriate to offer testing to an unaffected individual.     With multigene panel testing, it is not uncommon for a variant of uncertain significance (VUS) to be identified.  Variants are termed \"VUS\" when there is not sufficient evidence to classify the variant as a negative (not harmful) variant or a positive (harmful) mutation. Genetic testing labs work to reclassify all VUSs overtime and will issue an updated report when a reclassification occurs.  The majority (over 90%) of VUSs that are reclassified are found to be negative genetic variants, however a small percentage will be upgraded to a harmful mutation. Clinically, a VUS is treated as a negative result.  If genetic testing is negative or a variant of uncertain significance (VUS) is found, management should be guided by a family history-based risk assessment.  Medical care should not be altered based on a VUS result.  Genetic testing for other unaffected (never had cancer) relatives is not recommended for a VUS.    We discussed that there are limitations to testing an individual who has not had cancer.  A negative test result does not mean Ms. Wynn's risk for cancer is low or even normal, although the risk would not be as high as it would with positive (harmful mutation) result.  It could still be increased over the general population based on family history alone.  If Ms. Wynn tests negative it could be for several different reasons such as:    The possibility that there is a pathogenic variant causing cancer in the family, and that gene was on the patient's test, but Ms. Wynn did not inherit it. We cannot know if this is the case by only testing " the patient.  A familial mutation could therefore still be present in the family and other close family members are still at risk.  Ms. Wynn could have a pathogenic variant in one of the genes tested for that was not detected. Current testing will identify the overwhelming majority of pathogenic variants, but some are not detectable with current technology.   Ms. Wynn may carry a pathogenic variant in another gene associated with hereditary cancer predisposition that was not tested for, or the risk in the family may be due to other genetic factors that are not well understood.    ASSESSMENT AND PLAN  Ms. Wynn meets NCCN guidelines for genetic testing.  Ms. Wynn was offered genetic testing, but declined to pursue genetic testing at this time.  Genetic testing via multi-gene panel, which can evaluate multiple cancer risk genes simultaneously, was discussed as well as self-pay options when insurance does not cover testing.  Her estimated lifetime breast cancer risk is 12.1%.  Considering her half sister was diagnosed at the age of 36, breast cancer screenings may be warranted before the age of 40.  The age when to begin breast cancer screenings and at what frequency should be determined following a discussion with her provider regarding the family history and her personal risk factors.  Ms. Wynn was encouraged to contact me in the future if she had any questions or concerns, to update the family history or provide information regarding a new cancer diagnosis in the family, or to pursue genetic testing if she desires then.  Ms. Wynn asked excellent questions during the consult and did not demonstrate any acute psychosocial distress during our visit.  This clinic encounter was 30 minutes.    Esequiel Prado MS  Genetic Counselor, Cancer Clinic  [facility]    Cc:  Dahiana Parham MD

## 2024-07-18 ENCOUNTER — HOSPITAL ENCOUNTER (OUTPATIENT)
Dept: WOMENS IMAGING | Facility: HOSPITAL | Age: 29
Discharge: HOME | End: 2024-07-18
Admitting: OBSTETRICS & GYNECOLOGY
Payer: COMMERCIAL

## 2024-07-18 ENCOUNTER — OFFICE VISIT (OUTPATIENT)
Dept: OBSTETRICS AND GYNECOLOGY | Facility: HOSPITAL | Age: 29
End: 2024-07-18
Payer: COMMERCIAL

## 2024-07-18 ENCOUNTER — ROUTINE PRENATAL (OUTPATIENT)
Dept: OBSTETRICS AND GYNECOLOGY | Facility: CLINIC | Age: 29
End: 2024-07-18
Payer: COMMERCIAL

## 2024-07-18 VITALS — BODY MASS INDEX: 20.53 KG/M2 | WEIGHT: 135 LBS | DIASTOLIC BLOOD PRESSURE: 76 MMHG | SYSTOLIC BLOOD PRESSURE: 121 MMHG

## 2024-07-18 VITALS — BODY MASS INDEX: 20.47 KG/M2 | SYSTOLIC BLOOD PRESSURE: 110 MMHG | DIASTOLIC BLOOD PRESSURE: 70 MMHG | WEIGHT: 134.6 LBS

## 2024-07-18 DIAGNOSIS — Z34.90 PREGNANCY, UNSPECIFIED GESTATIONAL AGE: ICD-10-CM

## 2024-07-18 DIAGNOSIS — Z36.89 ENCOUNTER FOR FETAL ANATOMIC SURVEY: ICD-10-CM

## 2024-07-18 DIAGNOSIS — Z34.92 PRENATAL CARE IN SECOND TRIMESTER: Primary | ICD-10-CM

## 2024-07-18 DIAGNOSIS — Z82.79 FAMILY HISTORY OF CONGENITAL HEART DEFECT: ICD-10-CM

## 2024-07-18 DIAGNOSIS — Z84.81 FAMILY HISTORY OF CARRIER OF GENETIC DISEASE: ICD-10-CM

## 2024-07-18 DIAGNOSIS — Z82.79 FAMILY HISTORY OF CONGENITAL HEART DEFECT: Primary | ICD-10-CM

## 2024-07-18 LAB
GLUCOSE UR STRIP-MCNC: NEGATIVE MG/DL
PROT UR STRIP-MCNC: NEGATIVE MG/DL

## 2024-07-18 PROCEDURE — 76811 OB US DETAILED SNGL FETUS: CPT

## 2024-07-18 NOTE — PROGRESS NOTES
OB FOLLOW UP  CC- Here for care of pregnancy        Nika Wynn is a 29 y.o.  20w4d patient being seen today for her obstetrical follow up visit. Patient reports having a headache that lasted for a couple of days last week. Patient states that she did not take Tylenol for this headache.     Her prenatal care is complicated by (and status) : see below.  Patient Active Problem List   Diagnosis    Mitral valve prolapse    Mitral regurgitation    Connective tissue disorder    Family history of congenital heart defect    Family history of carrier of genetic disease    Pregnancy       Ultrasound Today: Yes with PDC.   AFP was declined.    ROS -     Patient Denies: leaking of fluid, vaginal bleeding, dysuria, excessive vomiting, and more than 6 contractions per hour  Fetal Movement : Yes  All other systems reviewed and are negative.       The additional following portions of the patient's history were reviewed and updated as appropriate: allergies and current medications.      I have reviewed and agree with the HPI, ROS, and historical information as entered above. Dahiana Landin MD      /70   Wt 61.1 kg (134 lb 9.6 oz)   LMP 2024   BMI 20.47 kg/m²       EXAM:     Prenatal Vitals  BP: 110/70  Weight: 61.1 kg (134 lb 9.6 oz)   Fetal Heart Rate: pos          Urine Glucose Read-only: Negative  Urine Protein Read-only: Negative       Assessment and Plan    Problem List Items Addressed This Visit    None  Visit Diagnoses       Prenatal care in second trimester    -  Primary    Relevant Orders    POC Urinalysis Dipstick (Completed)            Pregnancy at 20w4d  Anatomy scan today at [facility] reviewed  Fetal status reassuring.   Activity and Exercise discussed.  Patient is on Prenatal vitamins  Return in about 4 weeks (around 8/15/2024).      Dahiana Landin MD  2024

## 2024-07-18 NOTE — PROGRESS NOTES
Patient denies bleeding, leaking fluid or contractions  NIPT negative  Patients next follow up with Dr. Landin office is today

## 2024-07-18 NOTE — ASSESSMENT & PLAN NOTE
Patient referred today for family history of congenital heart disease.  Patient's previous child had a VSD and ASD requiring surgical repair at 6 months of age.  Patient does report a family history of kabuki syndrome in her half sister.  Patient reports no other family history of congenital or genetic disorders and no complications during this pregnancy.    Ultrasound today demonstrates a normally grown fetus.  Nuchal fold was minimally outside the normal range at 6.6 mm.  No other abnormalities were seen.  Fetal heart was well-visualized and appeared structurally normal.  Amniotic fluid volume and umbilical artery Dopplers were normal cervical length was normal.    With a previous child with congenital heart disease this pregnancy is at an increased risk for congenital heart defects.  The majority of these defects we would see today and we are not seeing any defects.  We do recommend fetal echocardiogram in 4 weeks time for 2 reasons.  First because of the family history of congenital heart disease.  Secondly with a minimally increased nuchal fold the risk of congenital heart disease is also increased.  Nuchal fold is also associated with an increased risk for trisomy 21 however with a low risk cell free DNA screen and no other abnormality seen on the ultrasound I believe the risk of chromosomal abnormalities to be very low.    Patient is scheduled back with us in 4 weeks time to perform a fetal echocardiogram and reassess fetal growth.

## 2024-07-18 NOTE — LETTER
2024     Dahiana Landin MD  [facility] [address] [place] [id number]    Patient: Nika Wynn   YOB: 1995   Date of Visit: 2024     Dear Dahiana Landin MD:       Thank you for referring Nika Wynn to me for evaluation. Below are the relevant portions of my assessment and plan of care.    If you have questions, please do not hesitate to call me. I look forward to following Nika along with you.         Sincerely,        Douglas A. Milligan, MD        CC: No Recipients    Milligan, Douglas A, MD  [phone]  Sign when Signing Visit  Documentation of the ultrasound findings, images, and interpretations will be available in the patient's Viewpoint report which is located in the imaging tab in chart review.    Maternal/Fetal Medicine Consult Note     Name: Nika Wynn    : 1995     MRN: [id number]     Referring Provider: Dahiana Landin MD    Chief Complaint  prev child with Lg VSD, and ASD,patients half sister has Ka    Subjective     History of Present Illness:  Nika Wynn is a 29 y.o.  20w4d who presents today for fam Hx CHD    SHUN: Estimated Date of Delivery: 24     ROS:   As noted in HPI.     Past Medical History:   Diagnosis Date   • Abnormal ECG    • Abnormal Pap smear of cervix    • Compression fracture of L2 vertebra    • Connective tissue disorder    • HPV (human papilloma virus) infection    • Mitral regurgitation    • Mitral valve prolapse    • Rh incompatibility    • Varicella       Past Surgical History:   Procedure Laterality Date   • NO PAST SURGERIES        OB History          2    Para   1    Term   1       0    AB   0    Living   1         SAB   0    IAB   0    Ectopic   0    Molar   0    Multiple   0    Live Births   1          Obstetric Comments   FOB #1 for pregnancy 1&2               Objective     Vital Signs  /76   Wt 61.2 kg (135 lb)   LMP 2024   Estimated body  "mass index is 20.53 kg/m² as calculated from the following:    Height as of 24: 172.7 cm (68\").    Weight as of this encounter: 61.2 kg (135 lb).    Physical Exam    Ultrasound Impression:   See Viewpoint    Assessment and Plan     Nika Wynn is a 29 y.o.  20w4d who presents today for family Hx CHD    Diagnoses and all orders for this visit:    1. Family history of congenital heart defect (Primary)  Assessment & Plan:  Patient referred today for family history of congenital heart disease.  Patient's previous child had a VSD and ASD requiring surgical repair at 6 months of age.  Patient does report a family history of kabuki syndrome in her half sister.  Patient reports no other family history of congenital or genetic disorders and no complications during this pregnancy.    Ultrasound today demonstrates a normally grown fetus.  Nuchal fold was minimally outside the normal range at 6.6 mm.  No other abnormalities were seen.  Fetal heart was well-visualized and appeared structurally normal.  Amniotic fluid volume and umbilical artery Dopplers were normal cervical length was normal.    With a previous child with congenital heart disease this pregnancy is at an increased risk for congenital heart defects.  The majority of these defects we would see today and we are not seeing any defects.  We do recommend fetal echocardiogram in 4 weeks time for 2 reasons.  First because of the family history of congenital heart disease.  Secondly with a minimally increased nuchal fold the risk of congenital heart disease is also increased.  Nuchal fold is also associated with an increased risk for trisomy 21 however with a low risk cell free DNA screen and no other abnormality seen on the ultrasound I believe the risk of chromosomal abnormalities to be very low.    Patient is scheduled back with us in 4 weeks time to perform a fetal echocardiogram and reassess fetal growth.    Orders:  -     [facility]  " [facility]; Future    2. Family history of carrier of genetic disease  -     [facility]; Future    3. Pregnancy, unspecified gestational age  -     [facility]; Future         Follow Up  Return in about 4 weeks (around 8/15/2024).    I spent 20 minutes caring for the patient on the day of service. This included: obtaining or reviewing a separately obtained medical history, reviewing patient records, performing a medically appropriate exam and/or evaluation, counseling or educating the patient/family/caregiver, ordering medications, labs, and/or procedures and documenting such in the medical record. This does not include time spent on review and interpretation of other tests such as fetal ultrasound or the performance of other procedures such as amniocentesis or CVS.      Douglas A. Milligan, MD  Maternal Fetal Medicine, [facility] [place]     2024

## 2024-07-18 NOTE — PROGRESS NOTES
"Documentation of the ultrasound findings, images, and interpretations will be available in the patient's Viewpoint report which is located in the imaging tab in chart review.    Maternal/Fetal Medicine Consult Note     Name: Nika Wynn    : 1995     MRN: [id number]     Referring Provider: Dahiana Landin MD    Chief Complaint  prev child with Lg VSD, and ASD,patients half sister has Ka    Subjective     History of Present Illness:  Nika Wynn is a 29 y.o.  20w4d who presents today for fam Hx CHD    SHUN: Estimated Date of Delivery: 24     ROS:   As noted in HPI.     Past Medical History:   Diagnosis Date    Abnormal ECG     Abnormal Pap smear of cervix     Compression fracture of L2 vertebra     Connective tissue disorder     HPV (human papilloma virus) infection     Mitral regurgitation     Mitral valve prolapse     Rh incompatibility     Varicella       Past Surgical History:   Procedure Laterality Date    NO PAST SURGERIES        OB History          2    Para   1    Term   1       0    AB   0    Living   1         SAB   0    IAB   0    Ectopic   0    Molar   0    Multiple   0    Live Births   1          Obstetric Comments   FOB #1 for pregnancy 1&2               Objective     Vital Signs  /76   Wt 61.2 kg (135 lb)   LMP 2024   Estimated body mass index is 20.53 kg/m² as calculated from the following:    Height as of 24: 172.7 cm (68\").    Weight as of this encounter: 61.2 kg (135 lb).    Physical Exam    Ultrasound Impression:   See Viewpoint    Assessment and Plan     Nika Wynn is a 29 y.o.  20w4d who presents today for family Hx CHD    Diagnoses and all orders for this visit:    1. Family history of congenital heart defect (Primary)  Assessment & Plan:  Patient referred today for family history of congenital heart disease.  Patient's previous child had a VSD and ASD requiring surgical repair at 6 months of " age.  Patient does report a family history of kabuki syndrome in her half sister.  Patient reports no other family history of congenital or genetic disorders and no complications during this pregnancy.    Ultrasound today demonstrates a normally grown fetus.  Nuchal fold was minimally outside the normal range at 6.6 mm.  No other abnormalities were seen.  Fetal heart was well-visualized and appeared structurally normal.  Amniotic fluid volume and umbilical artery Dopplers were normal cervical length was normal.    With a previous child with congenital heart disease this pregnancy is at an increased risk for congenital heart defects.  The majority of these defects we would see today and we are not seeing any defects.  We do recommend fetal echocardiogram in 4 weeks time for 2 reasons.  First because of the family history of congenital heart disease.  Secondly with a minimally increased nuchal fold the risk of congenital heart disease is also increased.  Nuchal fold is also associated with an increased risk for trisomy 21 however with a low risk cell free DNA screen and no other abnormality seen on the ultrasound I believe the risk of chromosomal abnormalities to be very low.    Patient is scheduled back with us in 4 weeks time to perform a fetal echocardiogram and reassess fetal growth.    Orders:  -     [facility]; Future    2. Family history of carrier of genetic disease  -     [facility]; Future    3. Pregnancy, unspecified gestational age  -     [facility]; Future         Follow Up  Return in about 4 weeks (around 8/15/2024).    I spent 20 minutes caring for the patient on the day of service. This included: obtaining or reviewing a separately obtained medical history, reviewing patient records, performing a medically appropriate exam and/or evaluation, counseling or educating the patient/family/caregiver, ordering medications, labs, and/or  procedures and documenting such in the medical record. This does not include time spent on review and interpretation of other tests such as fetal ultrasound or the performance of other procedures such as amniocentesis or CVS.      Douglas A. Milligan, MD  Maternal Fetal Medicine, [facility]     2024

## 2024-08-16 ENCOUNTER — HOSPITAL ENCOUNTER (OUTPATIENT)
Dept: WOMENS IMAGING | Facility: HOSPITAL | Age: 29
Discharge: HOME | End: 2024-08-16
Admitting: OBSTETRICS & GYNECOLOGY
Payer: COMMERCIAL

## 2024-08-16 ENCOUNTER — ROUTINE PRENATAL (OUTPATIENT)
Dept: OBSTETRICS AND GYNECOLOGY | Facility: CLINIC | Age: 29
End: 2024-08-16
Payer: COMMERCIAL

## 2024-08-16 ENCOUNTER — OFFICE VISIT (OUTPATIENT)
Dept: OBSTETRICS AND GYNECOLOGY | Facility: HOSPITAL | Age: 29
End: 2024-08-16
Payer: COMMERCIAL

## 2024-08-16 VITALS — SYSTOLIC BLOOD PRESSURE: 118 MMHG | BODY MASS INDEX: 21.9 KG/M2 | WEIGHT: 144 LBS | DIASTOLIC BLOOD PRESSURE: 68 MMHG

## 2024-08-16 VITALS — BODY MASS INDEX: 21.77 KG/M2 | DIASTOLIC BLOOD PRESSURE: 74 MMHG | WEIGHT: 143.2 LBS | SYSTOLIC BLOOD PRESSURE: 125 MMHG

## 2024-08-16 DIAGNOSIS — Z34.90 PREGNANCY, UNSPECIFIED GESTATIONAL AGE: ICD-10-CM

## 2024-08-16 DIAGNOSIS — Z82.79 FAMILY HISTORY OF CONGENITAL HEART DEFECT: Primary | ICD-10-CM

## 2024-08-16 DIAGNOSIS — I34.0 NONRHEUMATIC MITRAL VALVE REGURGITATION: ICD-10-CM

## 2024-08-16 DIAGNOSIS — I34.1 MITRAL VALVE PROLAPSE: ICD-10-CM

## 2024-08-16 DIAGNOSIS — Z84.81 FAMILY HISTORY OF CARRIER OF GENETIC DISEASE: ICD-10-CM

## 2024-08-16 DIAGNOSIS — Z82.79 FAMILY HISTORY OF CONGENITAL HEART DEFECT: ICD-10-CM

## 2024-08-16 DIAGNOSIS — Z3A.24 24 WEEKS GESTATION OF PREGNANCY: ICD-10-CM

## 2024-08-16 DIAGNOSIS — M35.9 CONNECTIVE TISSUE DISORDER: ICD-10-CM

## 2024-08-16 DIAGNOSIS — Z34.92 PRENATAL CARE IN SECOND TRIMESTER: Primary | ICD-10-CM

## 2024-08-16 LAB
GLUCOSE UR STRIP-MCNC: NEGATIVE MG/DL
PROT UR STRIP-MCNC: NEGATIVE MG/DL

## 2024-08-16 PROCEDURE — 76816 OB US FOLLOW-UP PER FETUS: CPT

## 2024-08-16 PROCEDURE — 76825 ECHO EXAM OF FETAL HEART: CPT

## 2024-08-16 PROCEDURE — 93325 DOPPLER ECHO COLOR FLOW MAPG: CPT

## 2024-08-16 PROCEDURE — 76827 ECHO EXAM OF FETAL HEART: CPT

## 2024-08-16 NOTE — PROGRESS NOTES
F/U with Dr. Landin today.  Reports +FM.  Denies regular contractions, cramping, leaking of fluid, vaginal bleeding.

## 2024-08-16 NOTE — PROGRESS NOTES
OB FOLLOW UP  CC- Here for care of pregnancy        Nika Wynn is a 29 y.o.  24w5d patient being seen today for her obstetrical follow up visit. Patient reports intermittent troy huggins, not painful, resolve with rest and hydration.    Her prenatal care is complicated by (and status) : see below.  Patient Active Problem List   Diagnosis    Mitral valve prolapse    Mitral regurgitation    Connective tissue disorder    Family history of congenital heart defect    Family history of carrier of genetic disease    Pregnancy       Ultrasound Today: Yes (Done at [facility], results pending)  Reviewed 1 hr glucose testing, TDAP, and Rhogam next visit.    ROS -   Patient Denies: leaking of fluid, vaginal bleeding, dysuria, excessive vomiting, and more than 6 contractions per hour  Fetal Movement : normal  All other systems reviewed and are negative.       The additional following portions of the patient's history were reviewed and updated as appropriate: allergies, current medications, past family history, past medical history, past social history, past surgical history, and problem list.      I have reviewed and agree with the HPI, ROS, and historical information as entered above. Henny Renae, APRN      /68   Wt 65.3 kg (144 lb)   LMP 2024   BMI 21.90 kg/m²       EXAM:     Prenatal Vitals  BP: 118/68  Weight: 65.3 kg (144 lb)   Fetal Heart Rate: [facility]               Urine Glucose Read-only: Negative  Urine Protein Read-only: Negative       Assessment and Plan    Problem List Items Addressed This Visit          Cardiac and Vasculature    Mitral valve prolapse    Overview     Maternal echo each trimester            Family History    Family history of congenital heart defect    Overview     G1 with CHD requiring open heart surgery at 6 months of age  Anatomy scan at [facility]  Fetal echo at 24 weeks at [facility]  suggested a  echo after delivery.         Family history of carrier of genetic disease        Gravid and     Pregnancy     Other Visit Diagnoses       Prenatal care in second trimester    -  Primary    Relevant Orders    POC Urinalysis Dipstick (Completed)            Pregnancy at 24w5d  Fetal status reassuring.  PDC scan reviewed today. Report is still pending. Per patient they did not have any concerns at this time, however they suggested a  echo after delivery.  1 hour gtt, CBC, Antibody screen, TDAP, RPR, and Rhogam  next visit. Instructions given  Fetal movement/PTL or Labor precautions  Reviewed routine prenatal care with the office and educational materials given  U/S ordered at follow up  Discussed/encouraged TDAP vaccination after 28 weeks  Reviewed Pre-eclampsia signs/symptoms  Activity and Exercise discussed.  Follow up in four weeks for 28 week visit.  Follow up in 8 weeks for repeat ultrasound @ [facility].      Henny Renae, APRN  2024

## 2024-08-16 NOTE — LETTER
"2024     Dahiana Landin MD  [address] [facility]  [facility] [address]    Patient: Nika Wynn   YOB: 1995   Date of Visit: 2024       Dear Dahiana Landin MD,    Thank you for referring Nika Wynn to me for evaluation. Below is a copy of my consult note.    If you have questions, please do not hesitate to call me. I look forward to following Nika along with you.         Sincerely,        Cheri Mario MD        CC: No Recipients                    Maternal/Fetal Medicine Consult Note     Name: Nika Wynn    : 1995     MRN: [id number]     Referring Provider: Dahiana Landin MD    Chief Complaint  Mat CHD; Prev child with CHD; Thickened NF    Subjective     History of Present Illness:  Nika Wynn is a 29 y.o.  26w0d who presents today for ***    SHUN: Estimated Date of Delivery: 24     ROS:   As noted in HPI.     Past Medical History:   Diagnosis Date   • Abnormal ECG    • Abnormal Pap smear of cervix    • Compression fracture of L2 vertebra    • Connective tissue disorder    • HPV (human papilloma virus) infection    • Mitral regurgitation    • Mitral valve prolapse    • Rh incompatibility    • Varicella       Past Surgical History:   Procedure Laterality Date   • NO PAST SURGERIES        OB History          2    Para   1    Term   1       0    AB   0    Living   1         SAB   0    IAB   0    Ectopic   0    Molar   0    Multiple   0    Live Births   1          Obstetric Comments   FOB #1 for pregnancy 1&2               Objective     Vital Signs  /74   Wt 65 kg (143 lb 3.2 oz)   LMP 2024   Estimated body mass index is 21.77 kg/m² as calculated from the following:    Height as of 24: 172.7 cm (68\").    Weight as of this encounter: 65 kg (143 lb 3.2 oz).    Physical Exam  Constitutional:       Appearance: Normal appearance. She is normal weight.   HENT:      Head: Normocephalic " and atraumatic.   Pulmonary:      Effort: Pulmonary effort is normal.   Musculoskeletal:         General: Normal range of motion.   Neurological:      General: No focal deficit present.      Mental Status: She is alert and oriented to person, place, and time.   Psychiatric:         Mood and Affect: Mood normal.         Behavior: Behavior normal.         Thought Content: Thought content normal.         Judgment: Judgment normal.     Ultrasound Impression:   Breech, S=D, nl KODAK, anterior placenta, nl CL, nl anatomy, nl echo.    Assessment and Plan     Diagnoses and all orders for this visit:    1. Family history of congenital heart defect (Primary)  Assessment & Plan:  Fetal echo within normal limits.     Will do follow-up ultrasound at 32wks.     Orders:  -     [facility]; Future    2. Family history of carrier of genetic disease  Assessment & Plan:  Pt's sister with Kabuki syndrome. Pt is s/p anatomy survey that showed a thickened NF but was otherwise normal.     Echo today appears normal.      - Follow-up scheduled in 8wks    Orders:  -     [facility]; Future    3. Connective tissue disorder  -      [facility]; Future    4. Mitral valve prolapse  Assessment & Plan:  Pt s/p echo and Cardiology visit on 24. EF 55-60% with mild mitral regurg and mild mitral prolapse. Stable from prior exam.      - Plan is for maternal echo per trimester    Orders:  -     [facility]; Future    5. Nonrheumatic mitral valve regurgitation    6. 24 weeks gestation of pregnancy         Follow Up  Return in about 8 weeks (around 10/11/2024).    I spent 10 minutes caring for the patient on the day of service. This included: obtaining or reviewing a separately obtained medical history, reviewing patient records, performing a medically appropriate exam and/or evaluation, counseling or educating the patient/family/caregiver, ordering medications,  labs, and/or procedures and documenting such in the medical record. This does not include time spent on review and interpretation of other tests such as fetal ultrasound or the performance of other procedures such as amniocentesis or CVS.      Cheri Mario MD  08/16/2024

## 2024-08-16 NOTE — LETTER
"2024     Dahiana Landin MD  [address] [facility] [address] [place] [id number]    Patient: Nika Wynn   YOB: 1995   Date of Visit: 2024       Dear Dahiana Landin MD,    Thank you for referring Nika Wynn to me for evaluation. Below is a copy of my consult note.    If you have questions, please do not hesitate to call me. I look forward to following Nika along with you.         Sincerely,        Cheri Mario MD        CC: No Recipients                    Maternal/Fetal Medicine Consult Note     Name: Nika Wynn    : 1995     MRN: [id number]     Referring Provider: Dahiana Landin MD    Chief Complaint  Mat CHD; Prev child with CHD; Thickened NF    Subjective     History of Present Illness:  Nika Wynn is a 29 y.o.  26w0d who presents today for a fetal echo.     Pt denies LOF/VB/ctx's. +FM.     SHUN: Estimated Date of Delivery: 24     ROS:   As noted in HPI.     Past Medical History:   Diagnosis Date   • Abnormal ECG    • Abnormal Pap smear of cervix    • Compression fracture of L2 vertebra    • Connective tissue disorder    • HPV (human papilloma virus) infection    • Mitral regurgitation    • Mitral valve prolapse    • Rh incompatibility    • Varicella       Past Surgical History:   Procedure Laterality Date   • NO PAST SURGERIES        OB History          2    Para   1    Term   1       0    AB   0    Living   1         SAB   0    IAB   0    Ectopic   0    Molar   0    Multiple   0    Live Births   1          Obstetric Comments   FOB #1 for pregnancy 1&2               Objective     Vital Signs  /74   Wt 65 kg (143 lb 3.2 oz)   LMP 2024   Estimated body mass index is 21.77 kg/m² as calculated from the following:    Height as of 24: 172.7 cm (68\").    Weight as of this encounter: 65 kg (143 lb 3.2 oz).    Physical Exam  Constitutional:       Appearance: Normal appearance. She is " normal weight.   HENT:      Head: Normocephalic and atraumatic.   Pulmonary:      Effort: Pulmonary effort is normal.   Musculoskeletal:         General: Normal range of motion.   Neurological:      General: No focal deficit present.      Mental Status: She is alert and oriented to person, place, and time.   Psychiatric:         Mood and Affect: Mood normal.         Behavior: Behavior normal.         Thought Content: Thought content normal.         Judgment: Judgment normal.     Ultrasound Impression:   Breech, S=D, nl KODAK, anterior placenta, nl CL, nl anatomy, nl echo.    Assessment and Plan     Diagnoses and all orders for this visit:    1. Family history of congenital heart defect (Primary)  Assessment & Plan:  Fetal echo within normal limits.     Will do follow-up ultrasound at 32wks.     Orders:  -     [facility]; Future    2. Family history of carrier of genetic disease  Assessment & Plan:  Pt's sister with Kabuki syndrome. Pt is s/p anatomy survey that showed a thickened NF but was otherwise normal.     Echo today appears normal.      - Follow-up scheduled in 8wks    Orders:  -     [facility]; Future    3. Connective tissue disorder  -     [facility]; Future    4. Mitral valve prolapse  Assessment & Plan:  Pt s/p echo and Cardiology visit on 24. EF 55-60% with mild mitral regurg and mild mitral prolapse. Stable from prior exam.      - Plan is for maternal echo per trimester    Orders:  -     [facility]; Future    5. Nonrheumatic mitral valve regurgitation    6. 24 weeks gestation of pregnancy         Follow Up  Return in about 8 weeks (around 10/11/2024).    I spent 10 minutes caring for the patient on the day of service. This included: obtaining or reviewing a separately obtained medical history, reviewing patient records, performing a medically appropriate exam and/or evaluation, counseling or educating the  patient/family/caregiver, ordering medications, labs, and/or procedures and documenting such in the medical record. This does not include time spent on review and interpretation of other tests such as fetal ultrasound or the performance of other procedures such as amniocentesis or CVS.      Cheri Mario MD  08/16/2024

## 2024-08-26 NOTE — PROGRESS NOTES
"    Maternal/Fetal Medicine Consult Note     Name: Nika Wynn    : 1995     MRN: [id number]     Referring Provider: Dahiana Landin MD    Chief Complaint  Mat CHD; Prev child with CHD; Thickened NF    Subjective     History of Present Illness:  Nika Wynn is a 29 y.o.  26w0d who presents today for a fetal echo.     Pt denies LOF/VB/ctx's. +FM.     SHUN: Estimated Date of Delivery: 24     ROS:   As noted in HPI.     Past Medical History:   Diagnosis Date    Abnormal ECG     Abnormal Pap smear of cervix     Compression fracture of L2 vertebra     Connective tissue disorder     HPV (human papilloma virus) infection     Mitral regurgitation     Mitral valve prolapse     Rh incompatibility     Varicella       Past Surgical History:   Procedure Laterality Date    NO PAST SURGERIES        OB History          2    Para   1    Term   1       0    AB   0    Living   1         SAB   0    IAB   0    Ectopic   0    Molar   0    Multiple   0    Live Births   1          Obstetric Comments   FOB #1 for pregnancy 1&2               Objective     Vital Signs  /74   Wt 65 kg (143 lb 3.2 oz)   LMP 2024   Estimated body mass index is 21.77 kg/m² as calculated from the following:    Height as of 24: 172.7 cm (68\").    Weight as of this encounter: 65 kg (143 lb 3.2 oz).    Physical Exam  Constitutional:       Appearance: Normal appearance. She is normal weight.   HENT:      Head: Normocephalic and atraumatic.   Pulmonary:      Effort: Pulmonary effort is normal.   Musculoskeletal:         General: Normal range of motion.   Neurological:      General: No focal deficit present.      Mental Status: She is alert and oriented to person, place, and time.   Psychiatric:         Mood and Affect: Mood normal.         Behavior: Behavior normal.         Thought Content: Thought content normal.         Judgment: Judgment normal.     Ultrasound Impression:   Breech, " S=D, nl KODAK, anterior placenta, nl CL, nl anatomy, nl echo.    Assessment and Plan     Diagnoses and all orders for this visit:    1. Family history of congenital heart defect (Primary)  Assessment & Plan:  Fetal echo within normal limits.     Will do follow-up ultrasound at 32wks.     Orders:  -     [facility]; Future    2. Family history of carrier of genetic disease  Assessment & Plan:  Pt's sister with Kabuki syndrome. Pt is s/p anatomy survey that showed a thickened NF but was otherwise normal.     Echo today appears normal.      - Follow-up scheduled in 8wks    Orders:  -     [facility]; Future    3. Connective tissue disorder  -      [facility]; Future    4. Mitral valve prolapse  Assessment & Plan:  Pt s/p echo and Cardiology visit on 24. EF 55-60% with mild mitral regurg and mild mitral prolapse. Stable from prior exam.      - Plan is for maternal echo per trimester    Orders:  -     [facility] [place]; Future    5. Nonrheumatic mitral valve regurgitation    6. 24 weeks gestation of pregnancy         Follow Up  Return in about 8 weeks (around 10/11/2024).    I spent 10 minutes caring for the patient on the day of service. This included: obtaining or reviewing a separately obtained medical history, reviewing patient records, performing a medically appropriate exam and/or evaluation, counseling or educating the patient/family/caregiver, ordering medications, labs, and/or procedures and documenting such in the medical record. This does not include time spent on review and interpretation of other tests such as fetal ultrasound or the performance of other procedures such as amniocentesis or CVS.      Cheri Mario MD  2024

## 2024-08-26 NOTE — ASSESSMENT & PLAN NOTE
Pt's sister with Kabuki syndrome. Pt is s/p anatomy survey that showed a thickened NF but was otherwise normal.     Echo today appears normal.      - Follow-up scheduled in 8wks

## 2024-08-26 NOTE — ASSESSMENT & PLAN NOTE
Pt s/p echo and Cardiology visit on 7/17/24. EF 55-60% with mild mitral regurg and mild mitral prolapse. Stable from prior exam.      - Plan is for maternal echo per trimester

## 2024-09-17 ENCOUNTER — ROUTINE PRENATAL (OUTPATIENT)
Dept: OBSTETRICS AND GYNECOLOGY | Facility: CLINIC | Age: 29
End: 2024-09-17
Payer: COMMERCIAL

## 2024-09-17 VITALS — BODY MASS INDEX: 22.56 KG/M2 | DIASTOLIC BLOOD PRESSURE: 68 MMHG | WEIGHT: 148.4 LBS | SYSTOLIC BLOOD PRESSURE: 122 MMHG

## 2024-09-17 DIAGNOSIS — Z34.93 PRENATAL CARE IN THIRD TRIMESTER: Primary | ICD-10-CM

## 2024-09-17 DIAGNOSIS — O26.893 RH NEGATIVE STATUS DURING PREGNANCY IN THIRD TRIMESTER: ICD-10-CM

## 2024-09-17 DIAGNOSIS — Z67.91 RH NEGATIVE STATUS DURING PREGNANCY IN THIRD TRIMESTER: ICD-10-CM

## 2024-09-17 LAB
ERYTHROCYTE [DISTWIDTH] IN BLOOD BY AUTOMATED COUNT: 12.8 % (ref 12.3–15.4)
GLUCOSE 1H P 50 G GLC PO SERPL-MCNC: 97 MG/DL (ref 65–139)
GLUCOSE UR STRIP-MCNC: NEGATIVE MG/DL
HCT VFR BLD AUTO: 39.6 % (ref 34–46.6)
HGB BLD-MCNC: 13.2 G/DL (ref 12–15.9)
MCH RBC QN AUTO: 31.7 PG (ref 26.6–33)
MCHC RBC AUTO-ENTMCNC: 33.3 G/DL (ref 31.5–35.7)
MCV RBC AUTO: 95 FL (ref 79–97)
PLATELET # BLD AUTO: 202 10*3/MM3 (ref 140–450)
PROT UR STRIP-MCNC: NEGATIVE MG/DL
RBC # BLD AUTO: 4.17 10*6/MM3 (ref 3.77–5.28)
WBC # BLD AUTO: 9.42 10*3/MM3 (ref 3.4–10.8)

## 2024-09-18 LAB
BLD GP AB SCN SERPL QL: NEGATIVE
RPR SER QL: NON REACTIVE

## 2024-10-11 ENCOUNTER — HOSPITAL ENCOUNTER (OUTPATIENT)
Dept: WOMENS IMAGING | Facility: HOSPITAL | Age: 29
Discharge: HOME | End: 2024-10-11
Admitting: OBSTETRICS & GYNECOLOGY
Payer: COMMERCIAL

## 2024-10-11 ENCOUNTER — OFFICE VISIT (OUTPATIENT)
Dept: OBSTETRICS AND GYNECOLOGY | Facility: HOSPITAL | Age: 29
End: 2024-10-11
Payer: COMMERCIAL

## 2024-10-11 DIAGNOSIS — I34.1 MITRAL VALVE PROLAPSE: ICD-10-CM

## 2024-10-11 DIAGNOSIS — I34.0 NONRHEUMATIC MITRAL VALVE REGURGITATION: ICD-10-CM

## 2024-10-11 DIAGNOSIS — M35.9 CONNECTIVE TISSUE DISORDER: ICD-10-CM

## 2024-10-11 DIAGNOSIS — Z82.79 FAMILY HISTORY OF CONGENITAL HEART DEFECT: ICD-10-CM

## 2024-10-11 DIAGNOSIS — Z84.81 FAMILY HISTORY OF CARRIER OF GENETIC DISEASE: ICD-10-CM

## 2024-10-11 DIAGNOSIS — Z3A.32 32 WEEKS GESTATION OF PREGNANCY: ICD-10-CM

## 2024-10-11 DIAGNOSIS — M35.9 CONNECTIVE TISSUE DISORDER: Primary | ICD-10-CM

## 2024-10-11 PROCEDURE — 76816 OB US FOLLOW-UP PER FETUS: CPT

## 2024-10-11 PROCEDURE — 76819 FETAL BIOPHYS PROFIL W/O NST: CPT

## 2024-10-15 ENCOUNTER — ROUTINE PRENATAL (OUTPATIENT)
Dept: OBSTETRICS AND GYNECOLOGY | Facility: CLINIC | Age: 29
End: 2024-10-15
Payer: COMMERCIAL

## 2024-10-15 VITALS — BODY MASS INDEX: 23.54 KG/M2 | WEIGHT: 154.8 LBS | DIASTOLIC BLOOD PRESSURE: 78 MMHG | SYSTOLIC BLOOD PRESSURE: 120 MMHG

## 2024-10-15 DIAGNOSIS — Z34.93 PRENATAL CARE IN THIRD TRIMESTER: Primary | ICD-10-CM

## 2024-10-15 LAB
GLUCOSE UR STRIP-MCNC: NEGATIVE MG/DL
PROT UR STRIP-MCNC: NEGATIVE MG/DL

## 2024-10-15 NOTE — PROGRESS NOTES
.      OB FOLLOW UP  CC- Here for care of pregnancy        Nika Wynn is a 29 y.o.  33w2d patient being seen today for her obstetrical follow up visit. Patient reports occasional swelling that occurs with standing for long periods of time. She also reports intermittent troy huggins/contractions.     Her prenatal care is complicated by (and status) : see below.  Patient Active Problem List   Diagnosis    Mitral valve prolapse    Mitral regurgitation    Connective tissue disorder    Family history of congenital heart defect    Family history of carrier of genetic disease    Pregnancy       Flu Status: Already given in current flu season  RSV: given today.  Ultrasound Today: Yes (Done at [facility], results pending)  Non Stress Test: No.    ROS -   Patient Denies: Loss of Fluid, Vaginal Spotting, Vision Changes, Headaches, Epigastric pain, and skin itching  Fetal Movement : normal  All other systems reviewed and are negative.       The additional following portions of the patient's history were reviewed and updated as appropriate: allergies, current medications, past family history, past medical history, past social history, past surgical history, and problem list.    I have reviewed and agree with the HPI, ROS, and historical information as entered above. Dahiana Landin MD      /78   Wt 70.2 kg (154 lb 12.8 oz)   LMP 2024   BMI 23.54 kg/m²       EXAM:     Prenatal Vitals  BP: 120/78  Weight: 70.2 kg (154 lb 12.8 oz)   Fetal Heart Rate: pos               Urine Glucose Read-only: Negative  Urine Protein Read-only: Negative           Assessment and Plan    Problem List Items Addressed This Visit    None  Visit Diagnoses       Prenatal care in third trimester    -  Primary    Relevant Orders    POC Urinalysis Dipstick (Completed)            Pregnancy at 33w2d  Fetal status reassuring.   Activity and Exercise discussed.  Fetal movement/PTL or Labor precautions  Return in about 2 weeks (around  10/29/2024).    Dahiana Landin MD  10/15/2024

## 2024-10-16 NOTE — PROGRESS NOTES
Patient seen in Maternal Fetal Medicine clinic today. Please see full note in under imaging tab of patient chart in Epic (Viewpoint report).    Cheri Mario MD

## 2024-10-29 ENCOUNTER — ROUTINE PRENATAL (OUTPATIENT)
Dept: OBSTETRICS AND GYNECOLOGY | Facility: CLINIC | Age: 29
End: 2024-10-29
Payer: COMMERCIAL

## 2024-10-29 VITALS — SYSTOLIC BLOOD PRESSURE: 124 MMHG | DIASTOLIC BLOOD PRESSURE: 74 MMHG | WEIGHT: 156.8 LBS | BODY MASS INDEX: 23.84 KG/M2

## 2024-10-29 DIAGNOSIS — R00.0 TACHYCARDIA: ICD-10-CM

## 2024-10-29 DIAGNOSIS — Z34.90 PRENATAL CARE, ANTEPARTUM: Primary | ICD-10-CM

## 2024-10-29 LAB
GLUCOSE UR STRIP-MCNC: NEGATIVE MG/DL
PROT UR STRIP-MCNC: NEGATIVE MG/DL

## 2024-10-29 NOTE — PROGRESS NOTES
OB FOLLOW UP  CC- Here for care of pregnancy        Nika Wynn is a 29 y.o.  35w2d patient being seen today for her obstetrical follow up visit. Patient reports occasional BH contractions.     Pt went to local ER on  for decreased fetal movement and fetal monitoring was done and everything was normal. Pt reports good movement since then.     Pt reports her resting HR has been in 120s.     Her prenatal care is complicated by (and status) : see below.  Patient Active Problem List   Diagnosis    Mitral valve prolapse    Mitral regurgitation    Connective tissue disorder    Family history of congenital heart defect    Family history of carrier of genetic disease    Pregnancy       GBS Status:  next visit    No Known Allergies       Flu Status: Already given in current flu season  RSV status: already given   Her Delivery Plan is: Undecided    US today: no, next US on  at [facility].   Non Stress Test: No.    ROS -   Patient Denies: Loss of Fluid, Vaginal Spotting, Vision Changes, Headaches, Nausea , Vomiting , Epigastric pain, and skin itching  Fetal Movement : normal  All other systems reviewed and are negative.       The additional following portions of the patient's history were reviewed and updated as appropriate: allergies and current medications.    I have reviewed and agree with the HPI, ROS, and historical information as entered above. Dahiana Landin MD        EXAM:     Prenatal Vitals  BP: 124/74  Weight: 71.1 kg (156 lb 12.8 oz)   Fetal Heart Rate: pos              Urine Glucose Read-only: Negative  Urine Protein Read-only: Negative           Assessment and Plan    Problem List Items Addressed This Visit    None  Visit Diagnoses       Prenatal care, antepartum    -  Primary    Relevant Orders    POC Urinalysis Dipstick (Completed)    Tachycardia        Relevant Orders    TSH    T4, Free            Pregnancy at 35w2d  Fetal status reassuring.   Reviewed Pre-eclampsia  signs/symptoms  DIscussed her HR and will check thyroid labs.   Delivery options reviewed with patient  Signs of labor reviewed  Kick counts reviewed  Activity and Exercise discussed.  Return in about 1 week (around 11/5/2024).    Dahiana Landin MD  10/29/2024

## 2024-10-30 LAB
T4 FREE SERPL-MCNC: 0.69 NG/DL (ref 0.82–1.77)
TSH SERPL DL<=0.005 MIU/L-ACNC: 1.81 UIU/ML (ref 0.45–4.5)

## 2024-11-01 ENCOUNTER — TELEPHONE (OUTPATIENT)
Dept: OBSTETRICS AND GYNECOLOGY | Facility: CLINIC | Age: 29
End: 2024-11-01
Payer: COMMERCIAL

## 2024-11-01 RX ORDER — LEVOTHYROXINE SODIUM 25 UG/1
25 TABLET ORAL
Qty: 30 TABLET | Refills: 1 | Status: SHIPPED | OUTPATIENT
Start: 2024-11-01

## 2024-11-06 ENCOUNTER — ROUTINE PRENATAL (OUTPATIENT)
Dept: OBSTETRICS AND GYNECOLOGY | Facility: CLINIC | Age: 29
End: 2024-11-06
Payer: COMMERCIAL

## 2024-11-06 ENCOUNTER — LAB (OUTPATIENT)
Dept: LAB | Facility: HOSPITAL | Age: 29
End: 2024-11-06
Payer: COMMERCIAL

## 2024-11-06 VITALS — WEIGHT: 155.6 LBS | DIASTOLIC BLOOD PRESSURE: 68 MMHG | BODY MASS INDEX: 23.66 KG/M2 | SYSTOLIC BLOOD PRESSURE: 114 MMHG

## 2024-11-06 DIAGNOSIS — Z34.93 PRENATAL CARE IN THIRD TRIMESTER: Primary | ICD-10-CM

## 2024-11-06 DIAGNOSIS — I34.1 MITRAL VALVE PROLAPSE: ICD-10-CM

## 2024-11-06 DIAGNOSIS — M35.9 CONNECTIVE TISSUE DISORDER: ICD-10-CM

## 2024-11-06 DIAGNOSIS — Z34.93 PRENATAL CARE IN THIRD TRIMESTER: ICD-10-CM

## 2024-11-06 LAB
GLUCOSE UR STRIP-MCNC: NEGATIVE MG/DL
PROT UR STRIP-MCNC: NEGATIVE MG/DL

## 2024-11-06 PROCEDURE — 87081 CULTURE SCREEN ONLY: CPT

## 2024-11-06 NOTE — PROGRESS NOTES
OB FOLLOW UP  CC- Here for care of pregnancy        Nika Wynn is a 29 y.o.  36w3d patient being seen today for her obstetrical follow up visit. Patient reports irregular contractions and intermittent pelvic cramping.     Her prenatal care is complicated by (and status) : see below.  Patient Active Problem List   Diagnosis    Mitral valve prolapse    Mitral regurgitation    Connective tissue disorder    Family history of congenital heart defect    Family history of carrier of genetic disease    Pregnancy       GBS Status: Done Today. She is not allergic to PCN.    No Known Allergies       Flu Status: Already given in current flu season  RSV status: already given   Her Delivery Plan is:  Does not desire IOL until after 38 weeks.      US today: No. Patient has scheduled US with [facility] on 2024.  Non Stress Test: No.    ROS -   Patient Denies: Loss of Fluid, Vaginal Spotting, Vision Changes, Headaches, Nausea , Vomiting , Epigastric pain, and skin itching  Fetal Movement : normal  All other systems reviewed and are negative.       The additional following portions of the patient's history were reviewed and updated as appropriate: allergies and current medications.    I have reviewed and agree with the HPI, ROS, and historical information as entered above. Dahiana Landin MD        EXAM:     Prenatal Vitals  BP: 114/68  Weight: 70.6 kg (155 lb 9.6 oz)   Fetal Heart Rate: pos       Dilation/Effacement/Station  Dilation: Closed      Urine Glucose Read-only: Negative  Urine Protein Read-only: Negative           Assessment and Plan    Problem List Items Addressed This Visit          Cardiac and Vasculature    Mitral valve prolapse    Overview     Maternal echo each trimester            Multi-system (Lupus, Sarcoid...)    Connective tissue disorder     Other Visit Diagnoses       Prenatal care in third trimester    -  Primary    Relevant Orders    POC Urinalysis Dipstick (Completed)    Group B  Streptococcus Culture - Swab, Vaginal/Rectum            Pregnancy at 36w3d  Fetal status reassuring.   Reviewed Pre-eclampsia signs/symptoms  Delivery options reviewed with patient  Signs of labor reviewed  Kick counts reviewed  Activity and Exercise discussed.  Return in about 1 week (around 11/13/2024), or OK to put in on 11/13.  My surgeries were all moved..    Dahiana Landin MD  11/06/2024

## 2024-11-08 ENCOUNTER — HOSPITAL ENCOUNTER (OUTPATIENT)
Dept: WOMENS IMAGING | Facility: HOSPITAL | Age: 29
Discharge: HOME | End: 2024-11-08
Admitting: OBSTETRICS & GYNECOLOGY
Payer: COMMERCIAL

## 2024-11-08 ENCOUNTER — OFFICE VISIT (OUTPATIENT)
Dept: OBSTETRICS AND GYNECOLOGY | Facility: HOSPITAL | Age: 29
End: 2024-11-08
Payer: COMMERCIAL

## 2024-11-08 VITALS — SYSTOLIC BLOOD PRESSURE: 127 MMHG | WEIGHT: 159 LBS | BODY MASS INDEX: 24.18 KG/M2 | DIASTOLIC BLOOD PRESSURE: 75 MMHG

## 2024-11-08 DIAGNOSIS — I34.1 MITRAL VALVE PROLAPSE: Primary | ICD-10-CM

## 2024-11-08 DIAGNOSIS — M35.9 CONNECTIVE TISSUE DISORDER: ICD-10-CM

## 2024-11-08 DIAGNOSIS — I34.1 MITRAL VALVE PROLAPSE: ICD-10-CM

## 2024-11-08 DIAGNOSIS — Z84.81 FAMILY HISTORY OF CARRIER OF GENETIC DISEASE: ICD-10-CM

## 2024-11-08 DIAGNOSIS — I34.0 NONRHEUMATIC MITRAL VALVE REGURGITATION: ICD-10-CM

## 2024-11-08 DIAGNOSIS — Z82.79 FAMILY HISTORY OF CONGENITAL HEART DEFECT: ICD-10-CM

## 2024-11-08 PROCEDURE — 76819 FETAL BIOPHYS PROFIL W/O NST: CPT

## 2024-11-08 PROCEDURE — 76816 OB US FOLLOW-UP PER FETUS: CPT

## 2024-11-08 NOTE — PROGRESS NOTES
Patient denies any leaking fluid or bleeding. States having troy huggins, relieved with rest.  NIPT negative  Patient states follow up with Dr. Landin' office is 11/13.

## 2024-11-09 LAB — BACTERIA SPEC AEROBE CULT: NORMAL

## 2024-11-13 ENCOUNTER — ROUTINE PRENATAL (OUTPATIENT)
Dept: OBSTETRICS AND GYNECOLOGY | Facility: CLINIC | Age: 29
End: 2024-11-13
Payer: COMMERCIAL

## 2024-11-13 VITALS — BODY MASS INDEX: 24.08 KG/M2 | SYSTOLIC BLOOD PRESSURE: 132 MMHG | DIASTOLIC BLOOD PRESSURE: 70 MMHG | WEIGHT: 158.4 LBS

## 2024-11-13 DIAGNOSIS — Z3A.37 37 WEEKS GESTATION OF PREGNANCY: ICD-10-CM

## 2024-11-13 DIAGNOSIS — M35.9 CONNECTIVE TISSUE DISORDER: ICD-10-CM

## 2024-11-13 DIAGNOSIS — I34.1 MITRAL VALVE PROLAPSE: ICD-10-CM

## 2024-11-13 DIAGNOSIS — Z34.93 PRENATAL CARE, THIRD TRIMESTER: Primary | ICD-10-CM

## 2024-11-13 LAB
EXPIRATION DATE: 0
GLUCOSE UR STRIP-MCNC: NEGATIVE MG/DL
Lab: 0
PROT UR STRIP-MCNC: NEGATIVE MG/DL

## 2024-11-13 NOTE — PROGRESS NOTES
OB FOLLOW UP  CC- Here for care of pregnancy        Nika Wynn is a 29 y.o.  37w3d patient being seen today for her obstetrical follow up visit. Patient reports intermittent mild heartburn (resolves on its own) and daily B[place] Sanders. Requesting cervix check today.    Her prenatal care is complicated by (and status): see below.  Patient Active Problem List   Diagnosis    Mitral valve prolapse    Mitral regurgitation    Connective tissue disorder    Family history of congenital heart defect    Family history of carrier of genetic disease    Pregnancy       GBS Status:   Group B Strep Culture   Date Value Ref Range Status   2024 No Group B Streptococcus isolated  Final         No Known Allergies       Flu Status: Already given in current flu season  Her Delivery Plan is:  to discuss with MD; does not desire IOL, but would like to pencil in 40wks     US today: no  Non Stress Test: No.    ROS -   Patient Denies: Loss of Fluid, Vaginal Spotting, Vision Changes, Headaches, Nausea , Vomiting , and skin itching  Fetal Movement: normal  All other systems reviewed and are negative.       The additional following portions of the patient's history were reviewed and updated as appropriate: allergies, current medications, past family history, past medical history, past social history, past surgical history, and problem list.    I have reviewed and agree with the HPI, ROS, and historical information as entered above. Dahiana Landin MD        EXAM:     Prenatal Vitals  BP: 132/70  Weight: 71.8 kg (158 lb 6.4 oz)   Fetal Heart Rate: pos              Urine Glucose Read-only: Negative  Urine Protein Read-only: Negative           Assessment and Plan    Problem List Items Addressed This Visit          Cardiac and Vasculature    Mitral valve prolapse    Overview     Maternal echo each trimester            Gravid and     Pregnancy    Relevant Orders    POC Glucose, Urine, Qualitative, Dipstick  (Completed)    POC Protein, Urine, Qualitative, Dipstick (Completed)       Multi-system (Lupus, Sarcoid...)    Connective tissue disorder     Other Visit Diagnoses       Prenatal care, third trimester    -  Primary    Relevant Orders    POC Glucose, Urine, Qualitative, Dipstick (Completed)    POC Protein, Urine, Qualitative, Dipstick (Completed)            Pregnancy at 37w3d  Fetal status reassuring.   Reviewed Pre-eclampsia signs/symptoms  Delivery options reviewed with patient  Signs of labor reviewed  Kick counts reviewed  Activity and Exercise discussed.  Return in about 1 week (around 11/20/2024).    Dahiana Landin MD  11/13/2024

## 2024-11-20 ENCOUNTER — ROUTINE PRENATAL (OUTPATIENT)
Dept: OBSTETRICS AND GYNECOLOGY | Facility: CLINIC | Age: 29
End: 2024-11-20
Payer: COMMERCIAL

## 2024-11-20 VITALS — SYSTOLIC BLOOD PRESSURE: 122 MMHG | BODY MASS INDEX: 24.42 KG/M2 | WEIGHT: 160.6 LBS | DIASTOLIC BLOOD PRESSURE: 72 MMHG

## 2024-11-20 DIAGNOSIS — Z34.93 PRENATAL CARE IN THIRD TRIMESTER: Primary | ICD-10-CM

## 2024-11-20 LAB
GLUCOSE UR STRIP-MCNC: NEGATIVE MG/DL
PROT UR STRIP-MCNC: NEGATIVE MG/DL

## 2024-11-20 NOTE — PROGRESS NOTES
OB FOLLOW UP  CC- Here for care of pregnancy        Nika Wynn is a 29 y.o.  38w3d patient being seen today for her obstetrical follow up visit. Patient reports BH contractions and low pelvic cramping.     Her prenatal care is complicated by (and status) : see below.  Patient Active Problem List   Diagnosis    Mitral valve prolapse    Mitral regurgitation    Connective tissue disorder    Family history of congenital heart defect    Family history of carrier of genetic disease    Pregnancy       GBS Status:   Group B Strep Culture   Date Value Ref Range Status   2024 No Group B Streptococcus isolated  Final         No Known Allergies     Flu Status: Already given in current flu season  Her Delivery Plan is: [facility] IOL after 40wks. Scheduled for 2024 at 0630.  US today: No  Non Stress Test: No    ROS -   Patient Denies: Loss of Fluid, Vaginal Spotting, Vision Changes, Headaches, Nausea , Vomiting , Epigastric pain, and skin itching  Fetal Movement : normal  All other systems reviewed and are negative.       The additional following portions of the patient's history were reviewed and updated as appropriate: allergies and current medications.    I have reviewed and agree with the HPI, ROS, and historical information as entered above. Dahiana Landin MD        EXAM:     Prenatal Vitals  BP: 122/72  Weight: 72.8 kg (160 lb 9.6 oz)   Fetal Heart Rate: pos       Dilation/Effacement/Station  Dilation: Closed      Urine Glucose Read-only: Negative  Urine Protein Read-only: Negative           Assessment and Plan    Problem List Items Addressed This Visit    None  Visit Diagnoses       Prenatal care in third trimester    -  Primary    Relevant Orders    POC Urinalysis Dipstick (Completed)            Pregnancy at 38w3d  Fetal status reassuring.   Reviewed Pre-eclampsia signs/symptoms  Delivery options reviewed with patient  Signs of labor reviewed  Kick counts reviewed  Activity and Exercise  discussed.  Return in about 1 week (around 11/27/2024).    Dahiana Landin MD  11/20/2024

## 2024-11-26 ENCOUNTER — ROUTINE PRENATAL (OUTPATIENT)
Dept: OBSTETRICS AND GYNECOLOGY | Facility: CLINIC | Age: 29
End: 2024-11-26
Payer: COMMERCIAL

## 2024-11-26 VITALS — DIASTOLIC BLOOD PRESSURE: 76 MMHG | WEIGHT: 161.4 LBS | BODY MASS INDEX: 24.54 KG/M2 | SYSTOLIC BLOOD PRESSURE: 120 MMHG

## 2024-11-26 DIAGNOSIS — Z34.93 PRENATAL CARE IN THIRD TRIMESTER: Primary | ICD-10-CM

## 2024-11-26 LAB
GLUCOSE UR STRIP-MCNC: NEGATIVE MG/DL
PROT UR STRIP-MCNC: NEGATIVE MG/DL

## 2024-11-26 PROCEDURE — 0502F SUBSEQUENT PRENATAL CARE: CPT | Performed by: OBSTETRICS & GYNECOLOGY

## 2024-11-26 NOTE — PROGRESS NOTES
OB FOLLOW UP  CC- Here for care of pregnancy        Nika Wynn is a 29 y.o.  39w2d patient being seen today for her obstetrical follow up visit. Patient reports occasional irregular ctx.     Her prenatal care is complicated by (and status) : see below.  Patient Active Problem List   Diagnosis    Mitral valve prolapse    Mitral regurgitation    Connective tissue disorder    Family history of congenital heart defect    Family history of carrier of genetic disease    Pregnancy       GBS Status:   Group B Strep Culture   Date Value Ref Range Status   2024 No Group B Streptococcus isolated  Final         No Known Allergies       Flu Status: Already given in current flu season  Her Delivery Plan is: [facility] IOL after 40wks. Scheduled 24 at 6:30 AM  US today: no  Non Stress Test: No.    ROS -   Patient Denies: Loss of Fluid, Vaginal Spotting, Vision Changes, Headaches, Nausea , Vomiting , Epigastric pain, and skin itching  Fetal Movement : normal  All other systems reviewed and are negative.       The additional following portions of the patient's history were reviewed and updated as appropriate: allergies and current medications.    I have reviewed and agree with the HPI, ROS, and historical information as entered above. .me        EXAM:     Prenatal Vitals  BP: 120/76  Weight: 73.2 kg (161 lb 6.4 oz)   Fetal Heart Rate: pos       Dilation/Effacement/Station  Dilation: Closed      Urine Glucose Read-only: Negative  Urine Protein Read-only: Negative           Assessment and Plan    Problem List Items Addressed This Visit    None  Visit Diagnoses       Prenatal care in third trimester    -  Primary    Relevant Orders    POC Urinalysis Dipstick (Completed)            Pregnancy at 39w2d  Fetal status reassuring.   Reviewed Pre-eclampsia signs/symptoms  Delivery options reviewed with patient  Signs of labor reviewed  Kick counts reviewed  Activity and Exercise discussed.  Induction process  discussed.    Dahiana Landin MD  11/26/2024

## 2024-11-27 ENCOUNTER — TELEPHONE (OUTPATIENT)
Dept: OBSTETRICS AND GYNECOLOGY | Facility: CLINIC | Age: 29
End: 2024-11-27

## 2024-11-27 ENCOUNTER — ROUTINE PRENATAL (OUTPATIENT)
Dept: OBSTETRICS AND GYNECOLOGY | Facility: CLINIC | Age: 29
End: 2024-11-27
Payer: COMMERCIAL

## 2024-11-27 ENCOUNTER — TELEPHONE (OUTPATIENT)
Dept: OBSTETRICS AND GYNECOLOGY | Facility: CLINIC | Age: 29
End: 2024-11-27
Payer: COMMERCIAL

## 2024-11-27 ENCOUNTER — HOSPITAL ENCOUNTER (INPATIENT)
Facility: HOSPITAL | Age: 29
LOS: 3 days | Discharge: HOME | End: 2024-11-30
Attending: OBSTETRICS & GYNECOLOGY | Admitting: OBSTETRICS & GYNECOLOGY
Payer: COMMERCIAL

## 2024-11-27 VITALS — WEIGHT: 159.4 LBS | BODY MASS INDEX: 24.24 KG/M2 | DIASTOLIC BLOOD PRESSURE: 92 MMHG | SYSTOLIC BLOOD PRESSURE: 138 MMHG

## 2024-11-27 DIAGNOSIS — O13.3 PREGNANCY-INDUCED HYPERTENSION, THIRD TRIMESTER: Primary | ICD-10-CM

## 2024-11-27 DIAGNOSIS — Z34.93 PRENATAL CARE IN THIRD TRIMESTER: ICD-10-CM

## 2024-11-27 PROBLEM — Z34.90 CURRENTLY PREGNANT: Status: ACTIVE | Noted: 2024-11-27

## 2024-11-27 PROBLEM — Z3A.39 39 WEEKS GESTATION OF PREGNANCY: Status: ACTIVE | Noted: 2024-11-27

## 2024-11-27 LAB
ABO GROUP BLD: NORMAL
ALP SERPL-CCNC: 151 U/L (ref 39–117)
ALT SERPL W P-5'-P-CCNC: 13 U/L (ref 1–33)
AST SERPL-CCNC: 16 U/L (ref 1–32)
BILIRUB SERPL-MCNC: 0.3 MG/DL (ref 0–1.2)
BLD GP AB SCN SERPL QL: POSITIVE
CREAT SERPL-MCNC: 0.51 MG/DL (ref 0.57–1)
DEPRECATED RDW RBC AUTO: 50.2 FL (ref 37–54)
ERYTHROCYTE [DISTWIDTH] IN BLOOD BY AUTOMATED COUNT: 14.4 % (ref 12.3–15.4)
GLUCOSE UR STRIP-MCNC: NEGATIVE MG/DL
HCT VFR BLD AUTO: 44 % (ref 34–46.6)
HGB BLD-MCNC: 14.4 G/DL (ref 12–15.9)
LDH SERPL-CCNC: 204 U/L (ref 135–214)
MCH RBC QN AUTO: 31.4 PG (ref 26.6–33)
MCHC RBC AUTO-ENTMCNC: 32.7 G/DL (ref 31.5–35.7)
MCV RBC AUTO: 96.1 FL (ref 79–97)
PLATELET # BLD AUTO: 173 10*3/MM3 (ref 140–450)
PMV BLD AUTO: 10.4 FL (ref 6–12)
PROT UR STRIP-MCNC: NEGATIVE MG/DL
RBC # BLD AUTO: 4.58 10*6/MM3 (ref 3.77–5.28)
RESIDUAL RHIG DETECTED: NORMAL
RH BLD: NEGATIVE
T&S EXPIRATION DATE: NORMAL
TREPONEMA PALLIDUM IGG+IGM AB [PRESENCE] IN SERUM OR PLASMA BY IMMUNOASSAY: NORMAL
URATE SERPL-MCNC: 4.3 MG/DL (ref 2.4–5.7)
WBC NRBC COR # BLD AUTO: 9.54 10*3/MM3 (ref 3.4–10.8)

## 2024-11-27 PROCEDURE — 84075 ASSAY ALKALINE PHOSPHATASE: CPT | Performed by: OBSTETRICS & GYNECOLOGY

## 2024-11-27 PROCEDURE — 86850 RBC ANTIBODY SCREEN: CPT | Performed by: OBSTETRICS & GYNECOLOGY

## 2024-11-27 PROCEDURE — 86900 BLOOD TYPING SEROLOGIC ABO: CPT | Performed by: OBSTETRICS & GYNECOLOGY

## 2024-11-27 PROCEDURE — 85027 COMPLETE CBC AUTOMATED: CPT | Performed by: OBSTETRICS & GYNECOLOGY

## 2024-11-27 PROCEDURE — 59025 FETAL NON-STRESS TEST: CPT

## 2024-11-27 PROCEDURE — 82565 ASSAY OF CREATININE: CPT | Performed by: OBSTETRICS & GYNECOLOGY

## 2024-11-27 PROCEDURE — 84450 TRANSFERASE (AST) (SGOT): CPT | Performed by: OBSTETRICS & GYNECOLOGY

## 2024-11-27 PROCEDURE — 36415 COLL VENOUS BLD VENIPUNCTURE: CPT | Performed by: OBSTETRICS & GYNECOLOGY

## 2024-11-27 PROCEDURE — 84460 ALANINE AMINO (ALT) (SGPT): CPT | Performed by: OBSTETRICS & GYNECOLOGY

## 2024-11-27 PROCEDURE — 86901 BLOOD TYPING SEROLOGIC RH(D): CPT | Performed by: OBSTETRICS & GYNECOLOGY

## 2024-11-27 PROCEDURE — 84550 ASSAY OF BLOOD/URIC ACID: CPT | Performed by: OBSTETRICS & GYNECOLOGY

## 2024-11-27 PROCEDURE — 86870 RBC ANTIBODY IDENTIFICATION: CPT | Performed by: OBSTETRICS & GYNECOLOGY

## 2024-11-27 PROCEDURE — 83615 LACTATE (LD) (LDH) ENZYME: CPT | Performed by: OBSTETRICS & GYNECOLOGY

## 2024-11-27 PROCEDURE — S0260 H&P FOR SURGERY: HCPCS | Performed by: OBSTETRICS & GYNECOLOGY

## 2024-11-27 PROCEDURE — 86780 TREPONEMA PALLIDUM: CPT | Performed by: OBSTETRICS & GYNECOLOGY

## 2024-11-27 PROCEDURE — 82247 BILIRUBIN TOTAL: CPT | Performed by: OBSTETRICS & GYNECOLOGY

## 2024-11-27 RX ORDER — OXYTOCIN/0.9 % SODIUM CHLORIDE 30/500 ML
2-20 PLASTIC BAG, INJECTION (ML) INTRAVENOUS
Status: DISCONTINUED | OUTPATIENT
Start: 2024-11-27 | End: 2024-11-28

## 2024-11-27 RX ORDER — ACETAMINOPHEN 325 MG/1
650 TABLET ORAL EVERY 4 HOURS PRN
Status: DISCONTINUED | OUTPATIENT
Start: 2024-11-27 | End: 2024-11-28

## 2024-11-27 RX ORDER — SODIUM CHLORIDE 0.9 % (FLUSH) 0.9 %
10 SYRINGE (ML) INJECTION AS NEEDED
Status: DISCONTINUED | OUTPATIENT
Start: 2024-11-27 | End: 2024-11-28 | Stop reason: HOSPADM

## 2024-11-27 RX ORDER — SODIUM CHLORIDE 0.9 % (FLUSH) 0.9 %
10 SYRINGE (ML) INJECTION EVERY 12 HOURS SCHEDULED
Status: DISCONTINUED | OUTPATIENT
Start: 2024-11-27 | End: 2024-11-28 | Stop reason: HOSPADM

## 2024-11-27 RX ORDER — LIDOCAINE HYDROCHLORIDE 10 MG/ML
0.5 INJECTION, SOLUTION EPIDURAL; INFILTRATION; INTRACAUDAL; PERINEURAL ONCE AS NEEDED
Status: DISCONTINUED | OUTPATIENT
Start: 2024-11-27 | End: 2024-11-28 | Stop reason: HOSPADM

## 2024-11-27 RX ORDER — SODIUM CHLORIDE, SODIUM LACTATE, POTASSIUM CHLORIDE, CALCIUM CHLORIDE 600; 310; 30; 20 MG/100ML; MG/100ML; MG/100ML; MG/100ML
125 INJECTION, SOLUTION INTRAVENOUS CONTINUOUS
Status: DISCONTINUED | OUTPATIENT
Start: 2024-11-27 | End: 2024-11-28

## 2024-11-27 RX ORDER — MAGNESIUM CARB/ALUMINUM HYDROX 105-160MG
30 TABLET,CHEWABLE ORAL ONCE
Status: DISCONTINUED | OUTPATIENT
Start: 2024-11-27 | End: 2024-11-28 | Stop reason: HOSPADM

## 2024-11-27 RX ORDER — SODIUM CHLORIDE 9 MG/ML
40 INJECTION, SOLUTION INTRAVENOUS AS NEEDED
Status: DISCONTINUED | OUTPATIENT
Start: 2024-11-27 | End: 2024-11-28 | Stop reason: HOSPADM

## 2024-11-27 NOTE — TELEPHONE ENCOUNTER
Pt called and stated that she has been having vision changes for 3 days in a row. Pt denies any swelling or BP being high during these times.

## 2024-11-27 NOTE — TELEPHONE ENCOUNTER
Pt states she would like for her IOL to be pushed back later next week. Currently it is scheduled for 12/3/2024 at 0630. Spoke with Dr. Landin. Per Dr. Landin, there are no openings and instructed pt to call back Monday 12/2 to see if anything becomes available. Pt v/u.

## 2024-11-27 NOTE — TELEPHONE ENCOUNTER
Provider: DR. DHALIWAL    Caller: Nika Wynn    Relationship to Patient: Self    Phone Number: [phone]    Reason for Call: PATIENT STATED SHE WAS SEEN BY DR. DHALIWAL. PATIENT DICUSSED WANTING TO MOVE INDUCTION TO LATER IN THE WEEK. CALLING TO SEE IF THIS R/S IS POSSIBLE.     When was the patient last seen: 11/26/24

## 2024-11-27 NOTE — TELEPHONE ENCOUNTER
Spoke to patient, her  vision changes have been intermittent for the past 3 days. She reports that she is experiencing dark black spots. She does report having a history of ocular migraines in the past. She states that it has been difficult to drive or read when changes occur. She was seen in office yesterday, but forgot to mention.  Denies any migraine after vision changes occur. Denies any swelling. Reports adequate fetal movement. Her b/p at home is 125/84. Her b/p at office visit yesterday was 120/76.     She does report intermittent RUQ pain that feels like a muscle soreness that she believes is related to position of baby.     Spoke to Dr. Landin, she would like patient to come in today for an appt and PEP. Patient scheduled and advised.

## 2024-11-27 NOTE — PROGRESS NOTES
OB FOLLOW UP  CC- Here for care of pregnancy        Nika Wynn is a 29 y.o.  39w3d patient being seen today for vision changes and RUQ pain. She reports that vision changes have been intermittent for the past 3 days. She reports that she is experiencing dark black spots. She does report having a history of ocular migraines in the past. She states that it has been difficult to drive or read when vision changes occur. She was seen in office yesterday, but forgot to mention her vision changes or RUQ pain.  She denies any migraine after vision changes occur. She denies any swelling. Reports adequate fetal movement. Her b/p at home is 125/84. Her b/p at office visit yesterday was 120/76. Her RUQ pain is intermittent and states that it feels like it's more related to baby's position.         Her prenatal care is complicated by (and status) :    Patient Active Problem List   Diagnosis    Mitral valve prolapse    Mitral regurgitation    Connective tissue disorder    Family history of congenital heart defect    Family history of carrier of genetic disease    Pregnancy         Ultrasound Today: No.    ROS -   Patient Reports :  Vision changes and RUQ pain   Patient Denies: Loss of Fluid, Vaginal Spotting, Contractions, Epigastric pain, and skin itching  Fetal Movement : normal  All other systems reviewed and are negative.       The additional following portions of the patient's history were reviewed and updated as appropriate: allergies, current medications, past family history, past medical history, past social history, past surgical history, and problem list.    I have reviewed and agree with the HPI, ROS, and historical information as entered above. CHICO Rodríguez      /92   Wt 72.3 kg (159 lb 6.4 oz)   LMP 2024   BMI 24.24 kg/m²       EXAM:     Prenatal Vitals  BP: 138/92  Weight: 72.3 kg (159 lb 6.4 oz)   Fetal Heart Rate: pos         138/84   DTR's 3+ and equal.   Mild pedal  edema.          Assessment and Plan    Problem List Items Addressed This Visit    None  Visit Diagnoses       Pregnancy-induced hypertension, third trimester    -  Primary    Prenatal care in third trimester        Relevant Orders    POC Urinalysis Dipstick (Completed)            Pregnancy at 39w3d with mildly elevated BP and symptoms concerning for preeclampsia.   Discussed with Dr. Landin, since she is term, will send downstairs to L&D for serial BP and labs, potential IOL.       Lisa Mcnulty, APRN  11/27/2024

## 2024-11-27 NOTE — PLAN OF CARE
Problem: Adult Inpatient Plan of Care  Goal: Plan of Care Review  Outcome: Progressing  Goal: Patient-Specific Goal (Individualized)  Outcome: Progressing  Flowsheets (Taken 11/27/2024 1709)  Patient/Family-Specific Goals (Include Timeframe): The patient will experience adequate pain control throughout the induction of labor process and immediate postpartum recovery period based on a patient reported numerical 0-10 pain scale.  Goal: Absence of Hospital-Acquired Illness or Injury  Outcome: Progressing  Goal: Optimal Comfort and Wellbeing  Outcome: Progressing  Goal: Readiness for Transition of Care  Outcome: Progressing  Intervention: Mutually Develop Transition Plan  Recent Flowsheet Documentation  Taken 11/27/2024 1703 by Laura Zimmer, RN  Equipment Currently Used at Home: none  Taken 11/27/2024 1701 by Laura Zimmer, RN  Transportation Anticipated: car, drives self  Patient/Family Anticipated Services at Transition: none  Patient/Family Anticipates Transition to: home     Problem: Labor  Goal: Hemostasis  Outcome: Progressing  Goal: Stable Fetal Wellbeing  Outcome: Progressing  Goal: Effective Progression to Delivery  Outcome: Progressing  Goal: Absence of Infection Signs and Symptoms  Outcome: Progressing  Goal: Acceptable Pain Control  Outcome: Progressing  Goal: Normal Uterine Contraction Pattern  Outcome: Progressing   Goal Outcome Evaluation:

## 2024-11-27 NOTE — H&P
J[facility]  Obstetric History and Physical    Chief Complaint   Patient presents with    Scheduled Induction       Subjective     Patient is a 29 y.o. female  currently at 39w3d, who presents with pregnancy at 39 weeks 3 days.  Patient is having symptoms suggestive of preeclampsia with headache right upper quadrant pain and mild blood pressure elevations.  She is sent to labor [facility] for induction of labor..    Her prenatal care is benign.  Her previous obstetric/gynecological history is noted for is non-contributory.    The following portions of the patients history were reviewed and updated as appropriate: current medications .       Prenatal Information:  Prenatal Results       Initial Prenatal Labs       Test Value Reference Range Date Time    Hemoglobin  14.1 g/dL 11.1 - 15.9 24     Hematocrit  42.1 % 34.0 - 46.6 24     Platelets  186 x10E3/uL 150 - 450 24     Rubella IgG  8.06 index Immune >0.99 24     Hepatitis B SAg  Negative  Negative 24     Hepatitis C Ab  Non Reactive  Non Reactive 24     RPR  Non Reactive  Non Reactive 24 1108       Non Reactive  Non Reactive 24     T. Pallidum Ab         ABO  O   24 1545    Rh  Negative   24 1545    Antibody Screen  Negative  Negative 24     HIV  Non Reactive  Non Reactive 24     Urine Culture  Final report   24     Gonorrhea  Negative  Negative 24     Chlamydia  Negative  Negative 24     TSH  1.810 uIU/mL 0.450 - 4.500 10/29/24 1548    HgB A1c         Varicella IgG        Hemoglobinopathy Fractionation        Hemoglobinopathy (genetic testing)        Cystic fibrosis         Spinal muscular atrophy        Fragile X                  Fetal testing        Test Value Reference Range Date Time    NIPT        MSAFP        AFP-4                  2nd and 3rd Trimester       Test Value Reference Range Date Time    Hemoglobin (repeated)  14.4 g/dL 12.0 - 15.9 24 1545       13.2  g/dL 12.0 - 15.9 09/17/24 1108    Hematocrit (repeated)  44.0 % 34.0 - 46.6 11/27/24 1545       39.6 % 34.0 - 46.6 09/17/24 1108    Platelets   173 10*3/mm3 140 - 450 11/27/24 1545       202 10*3/mm3 140 - 450 09/17/24 1108       186 x10E3/uL 150 - 450 05/01/24     1 hour GTT   97 mg/dL 65 - 139 09/17/24 1108    Antibody Screen (repeated)  Positive   11/27/24 1545       Negative  Negative 09/17/24 1108    3rd TM syphilis scrn (repeated)  RPR   Non Reactive  Non Reactive 09/17/24 1108    3rd TM syphilis scrn (repeated) TP-Ab        3rd TM syphilis screen TB-Ab (FTA)        Syphilis cascade test TP-Ab (EIA)        Syphilis cascade TPPA        GTT Fasting        GTT 1 Hr        GTT 2 Hr        GTT 3 Hr        Group B Strep  No Group B Streptococcus isolated   11/06/24 1803              Other testing        Test Value Reference Range Date Time    Parvo IgG         CMV IgG                   Drug Screening       Test Value Reference Range Date Time    Amphetamine Screen  Negative ng/mL Tsvvae=7610 05/01/24     Barbiturate Screen  Negative ng/mL Psmehy=168 05/01/24     Benzodiazepine Screen  Negative ng/mL Gcnqid=605 05/01/24     Methadone Screen  Negative ng/mL Bzuczv=755 05/01/24     Phencyclidine Screen  Negative ng/mL Cutoff=25 05/01/24     Opiates Screen  Negative ng/mL Shfofs=048 05/01/24     THC Screen  Negative ng/mL Cutoff=20 05/01/24     Cocaine Screen  Negative ng/mL Ezjmsu=438 05/01/24     Propoxyphene Screen  Negative ng/mL Jjpldo=912 05/01/24     Buprenorphine Screen        Methamphetamine Screen        Oxycodone Screen        Tricyclic Antidepressants Screen                  Legend    ^: Historical                          External Prenatal Results       Pregnancy Outside Results - Transcribed From Office Records - See Scanned Records For Details       Test Value Date Time    ABO  O  11/27/24 1545    Rh  Negative  11/27/24 1545    Antibody Screen  Positive  11/27/24 1545       Negative  09/17/24 1108        Negative  24     Varicella IgG       Rubella  8.06 index 24     Hgb  14.4 g/dL 24 1545       13.2 g/dL 24 1108       14.1 g/dL 24     Hct  44.0 % 24 1545       39.6 % 24 1108       42.1 % 24     HgB A1c        1h GTT  97 mg/dL 24 1108    3h GTT Fasting       3h GTT 1 hour       3h GTT 2 hour       3h GTT 3 hour        Gonorrhea (discrete)  Negative  24     Chlamydia (discrete)  Negative  24     RPR  Non Reactive  24 1108       Non Reactive  24     Syphils cascade: TP-Ab (FTA)       TP-Ab       TP-Ab (EIA)       TPPA       HBsAg  Negative  24     Herpes Simplex Virus PCR       Herpes Simplex VIrus Culture       HIV  Non Reactive  24     Hep C RNA Quant PCR       Hep C Antibody  Non Reactive  24     AFP       NIPT       Cystic Fibrosis (To)       Cystic Fibroisis        Spinal Muscular atrophy       Fragile X       Group B Strep  No Group B Streptococcus isolated  24 1803    GBS Susceptibility to Clindamycin       GBS Susceptibility to Erythromycin       Fetal Fibronectin       Genetic Testing, Maternal Blood                 Drug Screening       Test Value Date Time    Urine Drug Screen       Amphetamine Screen  Negative ng/mL 24     Barbiturate Screen  Negative ng/mL 24     Benzodiazepine Screen  Negative ng/mL 24     Methadone Screen  Negative ng/mL 24     Phencyclidine Screen  Negative ng/mL 24     Opiates Screen       THC Screen       Cocaine Screen       Propoxyphene Screen  Negative ng/mL 24     Buprenorphine Screen       Methamphetamine Screen       Oxycodone Screen       Tricyclic Antidepressants Screen                 Legend    ^: Historical                             Past OB History:     OB History    Para Term  AB Living   2 1 1 0 0 1   SAB IAB Ectopic Molar Multiple Live Births   0 0 0 0 0 1      # Outcome Date GA Lbr Wyatt/2nd Weight Sex Type Anes PTL  Lv   2 Current            1 Term 10/02/22 38w1d 07:10 / 00:27 3255 g (7 lb 2.8 oz) M Vag-Spont Local N RON      Name: AG GRANADO      Apgar1: 9  Apgar5: 9      Obstetric Comments   FOB #1 for pregnancy 1&2       Past Medical History: Past Medical History:   Diagnosis Date    Abnormal ECG     Abnormal Pap smear of cervix 2023    Compression fracture of L2 vertebra     Connective tissue disorder     HPV (human papilloma virus) infection 2023    Mitral regurgitation     Mitral valve prolapse     Rh incompatibility 2022    Varicella 2001      Past Surgical History Past Surgical History:   Procedure Laterality Date    COLPOSCOPY        Family History: Family History   Problem Relation Age of Onset    Heart disease Father     Breast cancer Sister 36        Half sister- maternal    Cancer Maternal Grandmother     Stroke Maternal Grandmother     Diabetes Maternal Grandmother     Breast cancer Maternal Great-Grandmother     Colon cancer Neg Hx     Ovarian cancer Neg Hx     Uterine cancer Neg Hx       Social History:  reports that she has never smoked. She has never used smokeless tobacco.   reports that she does not currently use alcohol.   reports no history of drug use.        General ROS: Pertinent items are noted in HPI    Objective       Vital Signs Range for the last 24 hours  Temperature: Temp:  [98 °F (36.7 °C)] 98 °F (36.7 °C)   Temp Source: Temp src: Oral   BP: BP: (128-150)/(82-92) 128/82   Pulse: Heart Rate:  [] 117   Respirations: Resp:  [16-20] 16   SPO2: SpO2:  [98 %] 98 %   O2 Amount (l/min):     O2 Devices     Weight: Weight:  [72.1 kg (159 lb)-72.3 kg (159 lb 6.4 oz)] 72.1 kg (159 lb)     Physical Examination: General appearance - alert, well appearing, and in no distress    Presentation: Vertex   Cervix: Exam by:     Dilation:     Effacement:     Station:         Fetal Heart Rate Assessment   Method:     Beats/min:     Baseline:     Varibility:     Accels:     Decels:     Tracing Category:    "    Uterine Assessment   Method:     Frequency (min):     Ctx Count in 10 min:     Duration:     Intensity:     Intensity by IUPC:     Resting Tone:     Resting Tone by IUPC:     [place] Units:       Laboratory Results:   Radiology Review:   Other Studies:     Assessment & Plan       Currently pregnant    39 weeks gestation of pregnancy    Pregnancy-induced hypertension, third trimester        Assessment:  1.  Intrauterine pregnancy at 39w3d weeks gestation with reactive fetal status.    2.  Term pregnancy early pregnancy-induced hypertension  3.  Obstetrical history significant for is non-contributory.  4.  GBS status: No results found for: \"GBSANTIGEN\"    Plan:  1.  Induction of labor and delivery.  2. Plan of care has been reviewed with patient and family  3.  Risks, benefits of treatment plan have been discussed.  4.  All questions have been answered.  5.        Xi Watkins MD  11/27/2024  18:28 EST  "

## 2024-11-28 LAB
ABO GROUP BLD: NORMAL
FETAL BLEED: NEGATIVE
NUMBER OF DOSES: NORMAL
RH BLD: NEGATIVE

## 2024-11-28 PROCEDURE — 25010000002 RHO D IMMUNE GLOBULIN 1500 UNIT/2ML SOLUTION PREFILLED SYRINGE: Performed by: OBSTETRICS & GYNECOLOGY

## 2024-11-28 PROCEDURE — 86900 BLOOD TYPING SEROLOGIC ABO: CPT | Performed by: OBSTETRICS & GYNECOLOGY

## 2024-11-28 PROCEDURE — 59400 OBSTETRICAL CARE: CPT | Performed by: OBSTETRICS & GYNECOLOGY

## 2024-11-28 PROCEDURE — 25810000003 LACTATED RINGERS PER 1000 ML: Performed by: OBSTETRICS & GYNECOLOGY

## 2024-11-28 PROCEDURE — 85461 HEMOGLOBIN FETAL: CPT | Performed by: OBSTETRICS & GYNECOLOGY

## 2024-11-28 PROCEDURE — 59025 FETAL NON-STRESS TEST: CPT

## 2024-11-28 PROCEDURE — 3E033VJ INTRODUCTION OF OTHER HORMONE INTO PERIPHERAL VEIN, PERCUTANEOUS APPROACH: ICD-10-PCS | Performed by: OBSTETRICS & GYNECOLOGY

## 2024-11-28 PROCEDURE — 3E0234Z INTRODUCTION OF SERUM, TOXOID AND VACCINE INTO MUSCLE, PERCUTANEOUS APPROACH: ICD-10-PCS | Performed by: OBSTETRICS & GYNECOLOGY

## 2024-11-28 PROCEDURE — 86901 BLOOD TYPING SEROLOGIC RH(D): CPT | Performed by: OBSTETRICS & GYNECOLOGY

## 2024-11-28 PROCEDURE — 10907ZC DRAINAGE OF AMNIOTIC FLUID, THERAPEUTIC FROM PRODUCTS OF CONCEPTION, VIA NATURAL OR ARTIFICIAL OPENING: ICD-10-PCS | Performed by: OBSTETRICS & GYNECOLOGY

## 2024-11-28 RX ORDER — HYDROCODONE BITARTRATE AND ACETAMINOPHEN 10; 325 MG/1; MG/1
1 TABLET ORAL EVERY 4 HOURS PRN
Status: DISCONTINUED | OUTPATIENT
Start: 2024-11-28 | End: 2024-11-30 | Stop reason: HOSPADM

## 2024-11-28 RX ORDER — METHYLERGONOVINE MALEATE 0.2 MG/ML
200 INJECTION INTRAVENOUS ONCE AS NEEDED
Status: DISCONTINUED | OUTPATIENT
Start: 2024-11-28 | End: 2024-11-28 | Stop reason: HOSPADM

## 2024-11-28 RX ORDER — BISACODYL 10 MG
10 SUPPOSITORY, RECTAL RECTAL DAILY PRN
Status: DISCONTINUED | OUTPATIENT
Start: 2024-11-29 | End: 2024-11-30 | Stop reason: HOSPADM

## 2024-11-28 RX ORDER — OXYTOCIN/0.9 % SODIUM CHLORIDE 30/500 ML
125 PLASTIC BAG, INJECTION (ML) INTRAVENOUS ONCE AS NEEDED
Status: DISCONTINUED | OUTPATIENT
Start: 2024-11-28 | End: 2024-11-30 | Stop reason: HOSPADM

## 2024-11-28 RX ORDER — HYDROCODONE BITARTRATE AND ACETAMINOPHEN 5; 325 MG/1; MG/1
1 TABLET ORAL EVERY 4 HOURS PRN
Status: DISCONTINUED | OUTPATIENT
Start: 2024-11-28 | End: 2024-11-30 | Stop reason: HOSPADM

## 2024-11-28 RX ORDER — OXYCODONE AND ACETAMINOPHEN 7.5; 325 MG/1; MG/1
2 TABLET ORAL EVERY 4 HOURS PRN
Status: DISCONTINUED | OUTPATIENT
Start: 2024-11-28 | End: 2024-11-28 | Stop reason: HOSPADM

## 2024-11-28 RX ORDER — LIDOCAINE HYDROCHLORIDE 10 MG/ML
INJECTION, SOLUTION INFILTRATION; PERINEURAL
Status: DISCONTINUED
Start: 2024-11-28 | End: 2024-11-30 | Stop reason: HOSPADM

## 2024-11-28 RX ORDER — ACETAMINOPHEN 325 MG/1
650 TABLET ORAL EVERY 4 HOURS PRN
Status: DISCONTINUED | OUTPATIENT
Start: 2024-11-28 | End: 2024-11-28 | Stop reason: HOSPADM

## 2024-11-28 RX ORDER — OXYTOCIN/0.9 % SODIUM CHLORIDE 30/500 ML
250 PLASTIC BAG, INJECTION (ML) INTRAVENOUS CONTINUOUS
Status: ACTIVE | OUTPATIENT
Start: 2024-11-28 | End: 2024-11-28

## 2024-11-28 RX ORDER — LEVOTHYROXINE SODIUM 25 UG/1
25 TABLET ORAL
Status: DISCONTINUED | OUTPATIENT
Start: 2024-11-29 | End: 2024-11-28 | Stop reason: SDUPTHER

## 2024-11-28 RX ORDER — ACETAMINOPHEN 325 MG/1
650 TABLET ORAL EVERY 6 HOURS PRN
Status: DISCONTINUED | OUTPATIENT
Start: 2024-11-28 | End: 2024-11-30 | Stop reason: HOSPADM

## 2024-11-28 RX ORDER — MORPHINE SULFATE 2 MG/ML
2 INJECTION, SOLUTION INTRAMUSCULAR; INTRAVENOUS
Status: DISCONTINUED | OUTPATIENT
Start: 2024-11-28 | End: 2024-11-28 | Stop reason: HOSPADM

## 2024-11-28 RX ORDER — LEVOTHYROXINE SODIUM 25 UG/1
25 TABLET ORAL
Status: DISCONTINUED | OUTPATIENT
Start: 2024-11-28 | End: 2024-11-28

## 2024-11-28 RX ORDER — LEVOTHYROXINE SODIUM 25 UG/1
25 TABLET ORAL
Status: DISCONTINUED | OUTPATIENT
Start: 2024-11-29 | End: 2024-11-30 | Stop reason: HOSPADM

## 2024-11-28 RX ORDER — IBUPROFEN 600 MG/1
600 TABLET, FILM COATED ORAL EVERY 6 HOURS PRN
Status: DISCONTINUED | OUTPATIENT
Start: 2024-11-28 | End: 2024-11-30 | Stop reason: HOSPADM

## 2024-11-28 RX ORDER — OXYTOCIN/0.9 % SODIUM CHLORIDE 30/500 ML
999 PLASTIC BAG, INJECTION (ML) INTRAVENOUS ONCE
Status: DISCONTINUED | OUTPATIENT
Start: 2024-11-28 | End: 2024-11-28 | Stop reason: HOSPADM

## 2024-11-28 RX ORDER — HYDROCORTISONE 25 MG/G
1 CREAM TOPICAL AS NEEDED
Status: DISCONTINUED | OUTPATIENT
Start: 2024-11-28 | End: 2024-11-30 | Stop reason: HOSPADM

## 2024-11-28 RX ORDER — PROMETHAZINE HYDROCHLORIDE 12.5 MG/1
12.5 SUPPOSITORY RECTAL EVERY 6 HOURS PRN
Status: DISCONTINUED | OUTPATIENT
Start: 2024-11-28 | End: 2024-11-28 | Stop reason: HOSPADM

## 2024-11-28 RX ORDER — CARBOPROST TROMETHAMINE 250 UG/ML
250 INJECTION, SOLUTION INTRAMUSCULAR AS NEEDED
Status: DISCONTINUED | OUTPATIENT
Start: 2024-11-28 | End: 2024-11-28 | Stop reason: HOSPADM

## 2024-11-28 RX ORDER — MISOPROSTOL 200 UG/1
800 TABLET ORAL AS NEEDED
Status: DISCONTINUED | OUTPATIENT
Start: 2024-11-28 | End: 2024-11-28 | Stop reason: HOSPADM

## 2024-11-28 RX ORDER — DOCUSATE SODIUM 100 MG/1
100 CAPSULE, LIQUID FILLED ORAL 2 TIMES DAILY
Status: DISCONTINUED | OUTPATIENT
Start: 2024-11-28 | End: 2024-11-30 | Stop reason: HOSPADM

## 2024-11-28 RX ORDER — PROMETHAZINE HYDROCHLORIDE 12.5 MG/1
12.5 TABLET ORAL EVERY 6 HOURS PRN
Status: DISCONTINUED | OUTPATIENT
Start: 2024-11-28 | End: 2024-11-28 | Stop reason: HOSPADM

## 2024-11-28 RX ORDER — PROMETHAZINE HYDROCHLORIDE 25 MG/1
25 TABLET ORAL EVERY 6 HOURS PRN
Status: DISCONTINUED | OUTPATIENT
Start: 2024-11-28 | End: 2024-11-30 | Stop reason: HOSPADM

## 2024-11-28 RX ADMIN — Medication 1 APPLICATION: at 15:49

## 2024-11-28 RX ADMIN — WITCH HAZEL 1 PAD: 500 SOLUTION RECTAL; TOPICAL at 15:49

## 2024-11-28 RX ADMIN — DOCUSATE SODIUM 100 MG: 100 CAPSULE, LIQUID FILLED ORAL at 20:16

## 2024-11-28 RX ADMIN — Medication 250 ML/HR: at 13:49

## 2024-11-28 RX ADMIN — ACETAMINOPHEN 650 MG: 325 TABLET ORAL at 15:49

## 2024-11-28 RX ADMIN — Medication: at 15:49

## 2024-11-28 RX ADMIN — HUMAN RHO(D) IMMUNE GLOBULIN 1500 UNITS: 1500 SOLUTION INTRAMUSCULAR; INTRAVENOUS at 18:35

## 2024-11-28 RX ADMIN — SODIUM CHLORIDE, POTASSIUM CHLORIDE, SODIUM LACTATE AND CALCIUM CHLORIDE 125 ML/HR: 600; 310; 30; 20 INJECTION, SOLUTION INTRAVENOUS at 08:56

## 2024-11-28 RX ADMIN — LEVOTHYROXINE SODIUM 25 MCG: 25 TABLET ORAL at 07:49

## 2024-11-28 NOTE — LACTATION NOTE
24 1712   Maternal Information   Date of Referral 24   Person Making Referral lactation consultant   Infant Reason for Referral  infant   Maternal Infant Feeding   Maternal Emotional State receptive   Pain with Feeding no   Milk Expression/Equipment   Breast Pump Type double electric, personal  (spectra gold from prev. pregnancy, plans to order wearable momcozy)   Lactation Referrals   Lactation Referrals outpatient lactation program     Courtesy visit for newly postpartum couplet with prior breastfeeding experience of > 18 months. GIL reports no pain with latch and that infant seems to latch well. GIL reports oversupply currently as she began pumping at 37 weeks to attempt inducing labor, and states she was getting '1.5 oz bilaterally within a few minutes'. GIL states she did pump at times with first child due to heart surgery however mainly exclusively  and plans to do the same this time.  has gold spectra pump from previous child and plans to purchase a wearable momcozy pump. MOB also inquired about milk donation, given envelope from the milk bank for milk donation.     Educational handouts and milk storage guidelines provided and reviewed, booklet beginning on page 36 reviewed with patient regarding breastfeeding education. Encouraged to call out for latch check, verbalized understanding. Encouraged to call out for concerns through hospitalization and f/u with outpatient clinic PRN after discharge. Denies further questions or concerns at this time.

## 2024-11-28 NOTE — PROGRESS NOTES
Patient's Giordano bulb still intact and it was removed by gentle tugging.  Cervix 4/80/-2.  Bag of water ruptured and clear.  Patient unsure if she wants to use Pitocin but we are going to start low-dose Pitocin after counseling her about the risks and she wants to proceed.  NST is category 1.

## 2024-11-28 NOTE — L&D DELIVERY NOTE
" [facility]   Vaginal Delivery Note    Patient Name: Nika Wynn  : 1995  MRN: [id number]    Date of Delivery: 2024    Diagnosis     Pre & Post-Delivery:  Intrauterine pregnancy at 39w4d  Labor status: Induced Onset of Labor    Currently pregnant    39 weeks gestation of pregnancy    Pregnancy-induced hypertension, third trimester             Problem List    Transfer to Postpartum     Review the Delivery Report for details.     Delivery     Delivery: Vaginal, Spontaneous    YOB: 2022   Time of Birth:  Gestational Age 1:09 PM  39w4d     Anesthesia: None    Delivering clinician: Xi Watkins   Forceps?   No   Vacuum? No    Shoulder dystocia present: No        Delivery narrative: Patient delivered vaginally over no episiotomy under local anesthesia a female.  There is a tight nuchal cord cut on the perineum.  Baby delivered cried was handed off to the nurses after being suctioned.  Cord blood obtained placenta expressed uterus explored there was a 2 cm right periurethral tear that did not require suturing.  There were no other lacerations.   cc baby and mom doing well.      Infant     Findings: female infant     Infant observations: Weight: 4035 g (8 lb 14.3 oz)  Length: 20 in  Observations/Comments:        Apgars:   @ 1 minute /      @ 5 minutes   Infant Name:      Placenta & Cord         Placenta delivered  Spontaneous at   2024  1:13 PM    Cord: 3 vessels present.   Nuchal Cord?  yes; Number of nuchal loops present:  1   Cord blood obtained: Yes   Cord gases obtained:  No   Cord gas results: Venous:  No results found for: \"PHCVEN\", \"BECVEN\"    Arterial:  No results found for: \"PHCART\", \"BECART\"     Repair     Episiotomy: None    No    Lacerations: No   Estimated Blood Loss:  100     Quantitative Blood Loss:  100        Complications     none    Disposition     Mother to Mother Baby/Postpartum  in stable condition currently.  Baby to NBN  in stable condition " currently.    Xi Watkins MD  11/28/24  13:41 EST

## 2024-11-29 PROBLEM — Z67.91 RH NEGATIVE, ANTEPARTUM: Status: ACTIVE | Noted: 2024-11-29

## 2024-11-29 PROBLEM — O26.899 RH NEGATIVE, ANTEPARTUM: Status: ACTIVE | Noted: 2024-11-29

## 2024-11-29 LAB
BASOPHILS # BLD AUTO: 0.04 10*3/MM3 (ref 0–0.2)
BASOPHILS NFR BLD AUTO: 0.3 % (ref 0–1.5)
DEPRECATED RDW RBC AUTO: 52.2 FL (ref 37–54)
EOSINOPHIL # BLD AUTO: 0.04 10*3/MM3 (ref 0–0.4)
EOSINOPHIL NFR BLD AUTO: 0.3 % (ref 0.3–6.2)
ERYTHROCYTE [DISTWIDTH] IN BLOOD BY AUTOMATED COUNT: 14.4 % (ref 12.3–15.4)
HCT VFR BLD AUTO: 40.9 % (ref 34–46.6)
HGB BLD-MCNC: 13.4 G/DL (ref 12–15.9)
IMM GRANULOCYTES # BLD AUTO: 0.1 10*3/MM3 (ref 0–0.05)
IMM GRANULOCYTES NFR BLD AUTO: 0.8 % (ref 0–0.5)
LYMPHOCYTES # BLD AUTO: 1.41 10*3/MM3 (ref 0.7–3.1)
LYMPHOCYTES NFR BLD AUTO: 11.3 % (ref 19.6–45.3)
MCH RBC QN AUTO: 32.1 PG (ref 26.6–33)
MCHC RBC AUTO-ENTMCNC: 32.8 G/DL (ref 31.5–35.7)
MCV RBC AUTO: 98.1 FL (ref 79–97)
MONOCYTES # BLD AUTO: 0.73 10*3/MM3 (ref 0.1–0.9)
MONOCYTES NFR BLD AUTO: 5.9 % (ref 5–12)
NEUTROPHILS NFR BLD AUTO: 10.14 10*3/MM3 (ref 1.7–7)
NEUTROPHILS NFR BLD AUTO: 81.4 % (ref 42.7–76)
NRBC BLD AUTO-RTO: 0 /100 WBC (ref 0–0.2)
PLATELET # BLD AUTO: 155 10*3/MM3 (ref 140–450)
PMV BLD AUTO: 10.5 FL (ref 6–12)
RBC # BLD AUTO: 4.17 10*6/MM3 (ref 3.77–5.28)
WBC NRBC COR # BLD AUTO: 12.46 10*3/MM3 (ref 3.4–10.8)

## 2024-11-29 PROCEDURE — 85025 COMPLETE CBC W/AUTO DIFF WBC: CPT | Performed by: OBSTETRICS & GYNECOLOGY

## 2024-11-29 RX ADMIN — DOCUSATE SODIUM 100 MG: 100 CAPSULE, LIQUID FILLED ORAL at 10:23

## 2024-11-29 RX ADMIN — DOCUSATE SODIUM 100 MG: 100 CAPSULE, LIQUID FILLED ORAL at 20:29

## 2024-11-29 RX ADMIN — LEVOTHYROXINE SODIUM 25 MCG: 25 TABLET ORAL at 05:22

## 2024-11-29 NOTE — PROGRESS NOTES
Postpartum Progress Note    Patient name: Nika Wynn  YOB: 1995   MRN: [id number]  Referring Provider: Dahiana Landin MD  Admission Date: 2024  Date of Service: 2024    ID: 29 y.o.     Diagnosis:   S/p vaginal delivery     Currently pregnant    Mitral valve prolapse    Family history of congenital heart defect    39 weeks gestation of pregnancy    Pregnancy-induced hypertension, third trimester    Rh negative, antepartum       Subjective:      No complaints.  Moderate lochia.  Ambulating, voiding, tolerating diet.  Pain well controlled.  The patient is currently breastfeeding.   This baby is a female    Objective:      Vital signs:  Vital Signs Range for the last 24 hours  Temperature: Temp:  [98.1 °F (36.7 °C)-98.5 °F (36.9 °C)] 98.3 °F (36.8 °C)   Temp Source: Temp src: Oral   BP: BP: ()/(57-88) 110/67   Pulse: Heart Rate:  [] 86   Respirations: Resp:  [14-18] 16   Weight: 72.1 kg (159 lb)     General: Alert & oriented x4, in no apparent distress  Abdomen: soft, nontender  Uterus: firm, nontender  Extremities: nontender; no edema      Labs:  Lab Results   Component Value Date    WBC 12.46 (H) 2024    HGB 13.4 2024    HCT 40.9 2024    MCV 98.1 (H) 2024     2024     Results from last 7 days   Lab Units 24  1648   ABO TYPING  O   RH TYPING  Negative     External Prenatal Results       Pregnancy Outside Results - Transcribed From Office Records - See Scanned Records For Details       Test Value Date Time    ABO  O  24 1648    Rh  Negative  24 1648    Antibody Screen  Positive  24 1545       Negative  24 1108       Negative  24     Varicella IgG       Rubella  8.06 index 24     Hgb  13.4 g/dL 24 0936       14.4 g/dL 24 1545       13.2 g/dL 24 1108       14.1 g/dL 24     Hct  40.9 % 24 0936       44.0 % 24 1545       39.6 % 24 1108       42.1 %  05/01/24     HgB A1c        1h GTT  97 mg/dL 09/17/24 1108    3h GTT Fasting       3h GTT 1 hour       3h GTT 2 hour       3h GTT 3 hour        Gonorrhea (discrete)  Negative  05/01/24     Chlamydia (discrete)  Negative  05/01/24     RPR  Non Reactive  09/17/24 1108       Non Reactive  05/01/24     Syphils cascade: TP-Ab (FTA)  Non-Reactive  11/27/24 1545    TP-Ab  Non-Reactive  11/27/24 1545    TP-Ab (EIA)       TPPA       HBsAg  Negative  05/01/24     Herpes Simplex Virus PCR       Herpes Simplex VIrus Culture       HIV  Non Reactive  05/01/24     Hep C RNA Quant PCR       Hep C Antibody  Non Reactive  05/01/24     AFP       NIPT       Cystic Fibrosis (To)       Cystic Fibroisis        Spinal Muscular atrophy       Fragile X       Group B Strep  No Group B Streptococcus isolated  11/06/24 1803    GBS Susceptibility to Clindamycin       GBS Susceptibility to Erythromycin       Fetal Fibronectin       Genetic Testing, Maternal Blood                 Drug Screening       Test Value Date Time    Urine Drug Screen       Amphetamine Screen  Negative ng/mL 05/01/24     Barbiturate Screen  Negative ng/mL 05/01/24     Benzodiazepine Screen  Negative ng/mL 05/01/24     Methadone Screen  Negative ng/mL 05/01/24     Phencyclidine Screen  Negative ng/mL 05/01/24     Opiates Screen       THC Screen       Cocaine Screen       Propoxyphene Screen  Negative ng/mL 05/01/24     Buprenorphine Screen       Methamphetamine Screen       Oxycodone Screen       Tricyclic Antidepressants Screen                 Legend    ^: Historical                            Assessment/Plan:      PPD#1 s/p vaginal delivery.  1. S/p vaginal delivery: Doing well.Continue routine postpartum care.    2. Infant feeding: Supportive care.  The patient is currently breastfeeding.  3. Mitral valve prolapse: Patient has follow up with [facility] in December. Infant will have fetal echo.   4. RH negative: rhogam given

## 2024-11-29 NOTE — PLAN OF CARE
Goal Outcome Evaluation:  Plan of Care Reviewed With: patient        Progress: improving        U/U, light bleeding, no concerns present at this time. Breastfeeding infant well. Pain well controlled.

## 2024-11-30 VITALS
RESPIRATION RATE: 18 BRPM | DIASTOLIC BLOOD PRESSURE: 62 MMHG | SYSTOLIC BLOOD PRESSURE: 124 MMHG | HEIGHT: 68 IN | OXYGEN SATURATION: 98 % | TEMPERATURE: 98.4 F | HEART RATE: 98 BPM | WEIGHT: 159 LBS | BODY MASS INDEX: 24.1 KG/M2

## 2024-11-30 PROCEDURE — 0503F POSTPARTUM CARE VISIT: CPT

## 2024-11-30 RX ORDER — IBUPROFEN 600 MG/1
600 TABLET, FILM COATED ORAL EVERY 6 HOURS PRN
Qty: 30 TABLET | Refills: 0 | Status: SHIPPED | OUTPATIENT
Start: 2024-11-30

## 2024-11-30 RX ADMIN — DOCUSATE SODIUM 100 MG: 100 CAPSULE, LIQUID FILLED ORAL at 09:19

## 2024-11-30 RX ADMIN — LEVOTHYROXINE SODIUM 25 MCG: 25 TABLET ORAL at 06:26

## 2024-11-30 NOTE — DISCHARGE SUMMARY
Discharge Summary    Date of Admission: 2024  Date of Discharge:  2024      Patient: Nika Wynn      MR#:[id number]    Delivery Provider: Xi Watkins    Discharge Surgeon/OB: June    Presenting Problem/History of Present Illness  Currently pregnant [Z34.90]       Currently pregnant    Mitral valve prolapse    Family history of congenital heart defect    39 weeks gestation of pregnancy    Pregnancy-induced hypertension, third trimester    Rh negative, antepartum         Discharge Diagnosis: Vaginal delivery at 39w4d    Procedures:  Vaginal, Spontaneous    2024   1:09 PM       Discharge Date: 2024;     Hospital Course  Patient is a 29 y.o. female  at 39w4d status post vaginal delivery without complication. She developed pregnancy induced hypertension. Preeclampsia panel was satisfactory. Blood pressure was managed without antihypertensive medication. She denied preeclampsia symptoms. Rhogam was given. Postpartum the patient did well. She remained afebrile, with vital signs stable. She reported lochia as mild and pain managed. She was ready for discharge on postpartum day 2.     Infant:   female fetus 4035 g (8 lb 14.3 oz) with Apgar scores of 6 , 9  at five minutes.    Condition on Discharge:  Stable    Vital Signs  Temp:  [98.1 °F (36.7 °C)-98.6 °F (37 °C)] 98.4 °F (36.9 °C)  Heart Rate:  [70-98] 98  Resp:  [16-18] 18  BP: (104-124)/(57-76) 124/62    Lab Results   Component Value Date    WBC 12.46 (H) 2024    HGB 13.4 2024    HCT 40.9 2024    MCV 98.1 (H) 2024     2024       Discharge Disposition  Home or Self Care    Discharge Medications     Discharge Medications        Continue These Medications        Instructions Start Date   ibuprofen 600 MG tablet  Commonly known as: ADVIL,MOTRIN   600 mg, Oral, Every 6 Hours PRN      levothyroxine 25 MCG tablet  Commonly known as: SYNTHROID, LEVOTHROID   25 mcg, Oral, Every Early Morning       PRENATAL VITAMINS PO   Take  by mouth.                 Activity at Discharge:   Activity Instructions       Pelvic Rest      No Sex, Tampons, Swimming, Tub Baths x 6 weeks            Follow-up Appointments  No future appointments.  Additional Instructions for the Follow-ups that You Need to Schedule       Call MD With Problems / Concerns   As directed      Instructions: Call for symptoms related to depression, foul smelling vaginal discharge, fever >/=100.4F, blood clots bigger than a golf ball, saturating through >/= 1 pad per hour, persistent headaches that do not resolve with medication, vision changes like spots or blurry vision, mid epigastric or right upper quadrant abdominal pain, shortness of breath not related to pregnancy, swelling, and/or BP >140/90. If you have any questions or concerns, feel free to contact us.    Order Comments: Instructions: Call for symptoms related to depression, foul smelling vaginal discharge, fever >/=100.4F, blood clots bigger than a golf ball, saturating through >/= 1 pad per hour, persistent headaches that do not resolve with medication, vision changes like spots or blurry vision, mid epigastric or right upper quadrant abdominal pain, shortness of breath not related to pregnancy, swelling, and/or BP >140/90. If you have any questions or concerns, feel free to contact us.         Discharge Follow-up with Specified Provider: June/NP; 1 Week   As directed      To: June/CAMILLA   Follow Up: 1 Week   Follow Up Details: BP check                CHICO Hidalgo  11/30/24  11:27 EST  Csd

## 2024-11-30 NOTE — LACTATION NOTE
11/30/24 1240   Maternal Information   Person Making Referral lactation consultant  (courtesy follow up prior to discharge; pt reports infant is breastfeeding well, milk coming in, feeling fullness; hx of oversupply/plugged ducts/mastitis x2 with previous child, discussed sunflower lecithin)   Maternal Infant Feeding   Maternal Emotional State independent;receptive;relaxed   Pain with Feeding no  (per pt report)   Lactation Referrals   Outpatient Lactation Program Lactation Follow-up Date/Time prn     All questions answered at this time. PRN Lactation Consultant/Outpatient Lactation Clinic contact encouraged.

## 2024-12-05 ENCOUNTER — POSTPARTUM VISIT (OUTPATIENT)
Dept: OBSTETRICS AND GYNECOLOGY | Facility: CLINIC | Age: 29
End: 2024-12-05
Payer: COMMERCIAL

## 2024-12-05 VITALS
WEIGHT: 142 LBS | HEIGHT: 68 IN | DIASTOLIC BLOOD PRESSURE: 70 MMHG | BODY MASS INDEX: 21.52 KG/M2 | SYSTOLIC BLOOD PRESSURE: 120 MMHG

## 2024-12-05 DIAGNOSIS — Z01.30 BP CHECK: ICD-10-CM

## 2024-12-05 NOTE — PROGRESS NOTES
"      Chief Complaint   Patient presents with    Postpartum Care       Postpartum blood pressure check visit         Nika Wynn is a 29 y.o.  who presents today for a blood pressure check.    Vaginal, Spontaneous   Information for the patient's :  Mona Wynn [[id number]]   2024   female   Mona Wynn   4035 g (8 lb 14.3 oz)   Gestational Age: 39w4d   Baby Discharged: Discharged with Mom  Delivering Physician: Xi Watkins MD    The patient was diagnosed with PIH.  The patient did not go home on blood pressure medication. She has not been checking her BP since discharge from the hospital. The patient has no complaints.  The patient denies Headache, Right upper quadrant/ epigastric pain, Vision changes, and Swelling.     Patient describes her vaginal bleeding as light.  Patient is breast feeding.  Desires contraceptive methods: None for contraception. She denies bowel or bladder issues.    Patient denies postpartum depression. Postpartum Depression Screening Questionnaire: 0, no treatment indicated.      The additional following portions of the patient's history were reviewed and updated as appropriate: allergies, current medications, past family history, past medical history, past social history, past surgical history, and problem list.      Review of Systems   Constitutional: Negative.    HENT: Negative.     Eyes: Negative.    Respiratory: Negative.     Cardiovascular: Negative.    Gastrointestinal: Negative.    Endocrine: Negative.    Genitourinary: Negative.    Musculoskeletal: Negative.    Skin: Negative.    Allergic/Immunologic: Negative.    Neurological: Negative.    Hematological: Negative.    Psychiatric/Behavioral: Negative.       All other systems reviewed and are negative.     I have reviewed and agree with the HPI, ROS, and historical information as entered above. Jose Armando Perdomo, APRN      Objective   /70   Ht 172.7 cm (68\")   Wt 64.4 kg (142 " lb)   LMP 02/05/2024   Breastfeeding Yes   BMI 21.59 kg/m²     Physical Exam  Vitals and nursing note reviewed.   Constitutional:       Appearance: Normal appearance. She is well-developed.   HENT:      Head: Normocephalic and atraumatic.   Cardiovascular:      Rate and Rhythm: Normal rate and regular rhythm.   Pulmonary:      Effort: Pulmonary effort is normal.      Breath sounds: Normal breath sounds.   Abdominal:      Palpations: Abdomen is soft. Abdomen is not rigid.   Musculoskeletal:      Cervical back: Normal range of motion.   Neurological:      Mental Status: She is alert and oriented to person, place, and time.   Psychiatric:         Behavior: Behavior normal.              Assessment and Plan    Problem List Items Addressed This Visit    None  Visit Diagnoses       Postpartum follow-up    -  Primary    BP check                S/p Vaginal delivery Doing well  Breastfeeding, Infant doing well.  Continued pelvic rest and light activity.   Continue monitoring BP at home. Call if >140/90  Consider contraception at 6 weeks  Return in about 5 weeks (around 1/9/2025) for PP.    Jose Armando Perdomo, APRN  12/05/2024

## 2024-12-09 ENCOUNTER — POSTPARTUM VISIT (OUTPATIENT)
Dept: OBSTETRICS AND GYNECOLOGY | Facility: CLINIC | Age: 29
End: 2024-12-09
Payer: COMMERCIAL

## 2024-12-09 ENCOUNTER — TELEPHONE (OUTPATIENT)
Dept: OBSTETRICS AND GYNECOLOGY | Facility: CLINIC | Age: 29
End: 2024-12-09
Payer: COMMERCIAL

## 2024-12-09 VITALS — DIASTOLIC BLOOD PRESSURE: 86 MMHG | BODY MASS INDEX: 20.68 KG/M2 | WEIGHT: 136 LBS | SYSTOLIC BLOOD PRESSURE: 124 MMHG

## 2024-12-09 DIAGNOSIS — O13.3 PREGNANCY-INDUCED HYPERTENSION, THIRD TRIMESTER: ICD-10-CM

## 2024-12-09 PROBLEM — Z3A.39 39 WEEKS GESTATION OF PREGNANCY: Status: RESOLVED | Noted: 2024-11-27 | Resolved: 2024-12-09

## 2024-12-09 PROBLEM — Z34.90 CURRENTLY PREGNANT: Status: RESOLVED | Noted: 2024-11-27 | Resolved: 2024-12-09

## 2024-12-09 PROBLEM — Z34.90 PREGNANCY: Status: RESOLVED | Noted: 2024-07-18 | Resolved: 2024-12-09

## 2024-12-09 PROCEDURE — 0503F POSTPARTUM CARE VISIT: CPT

## 2024-12-09 NOTE — PROGRESS NOTES
Chief Complaint   Patient presents with    Postpartum Care       Postpartum blood pressure check visit         Nika Wynn is a 29 y.o.  who presents today for a blood pressure check.    Vaginal, Spontaneous   Information for the patient's :  Mona Wynn [[id number]]   2022   female   Mona Wynn   4035 g (8 lb 14.3 oz)   Gestational Age: 39w4d   Baby Discharged: Discharged with Mom  Delivering Physician: Xi Watkins MD    The patient was diagnosed with PIH.  The patient did not go home on blood pressure medication. The patient complains of Headache.  The patient denies Right upper quadrant/ epigastric pain, Vision changes, and Swelling.     Patient reports she woke up yesterday morning with severe headache that was not relieved by Tylenol/rest. She reports she had been checking her BP at home and it would average 120-130/80-90s    Patient describes her vaginal bleeding as light.  Patient is breast feeding.  Desires contraceptive methods: Abstinence for contraception. She denies bowel or bladder issues.    Patient denies postpartum depression.     The additional following portions of the patient's history were reviewed and updated as appropriate: allergies and current medications.      Review of Systems   Neurological:  Positive for headache.   All other systems reviewed and are negative.    All other systems reviewed and are negative.     I have reviewed and agree with the HPI, ROS, and historical information as entered above. Henny Renae, APRN      Objective   /86   Wt 61.7 kg (136 lb)   LMP 2024   Breastfeeding Yes   BMI 20.68 kg/m²     Physical Exam  Vitals and nursing note reviewed.   Constitutional:       General: She is not in acute distress.     Appearance: Normal appearance. She is not ill-appearing, toxic-appearing or diaphoretic.   Pulmonary:      Effort: Pulmonary effort is normal. No respiratory distress.   Abdominal:       Palpations: Abdomen is soft.   Musculoskeletal:      Right lower leg: No edema.      Left lower leg: No edema.   Skin:     General: Skin is warm and dry.   Neurological:      Mental Status: She is alert and oriented to person, place, and time.   Psychiatric:         Mood and Affect: Mood normal.         Behavior: Behavior normal.         Thought Content: Thought content normal.         Judgment: Judgment normal.              Assessment and Plan    Problem List Items Addressed This Visit          Gravid and     Pregnancy-induced hypertension, third trimester     Other Visit Diagnoses       Postpartum follow-up    -  Primary    Relevant Orders    CBC (No Diff)    AlP+ALT+AST+Creat+LD+TBili+..            S/p Vaginal delivery 11 days ago  Continued pelvic rest.   Continue monitoring BP at home. Call if >140/90, PP pre-eclampsia signs reviewed, and Labs today  Occasional headaches denies other symptoms  Follow up in one week    Henny Renae, APRN  2024

## 2024-12-09 NOTE — TELEPHONE ENCOUNTER
11 days postpartum after  with Dr. Landin, IOL for PIH. Labs were last done before delivery and PEP's were negative.    Yesterday she woke up with a headache and her BP was 130's/80's.   Took excedrin migraine, and the headache did not improve. It has persisted into today. She took her BP at 3am and it was 135/89, she repeated in a few minutes later and it was 131/90.  She states she just doesn't feel very well overall, and her normal BP is in the 110's/60's so she is concerned.   She denies swelling, vision changes, and RUQ pain.   Scheduled for this afternoon at 1:30 with Henny, pt lives in [place] and can't be here until around then.

## 2024-12-10 LAB
ALP SERPL-CCNC: 114 U/L (ref 39–117)
ALT SERPL-CCNC: 15 U/L (ref 1–33)
AST SERPL-CCNC: 16 U/L (ref 1–32)
BASOPHILS # BLD AUTO: 0.07 10*3/MM3 (ref 0–0.2)
BASOPHILS NFR BLD AUTO: 0.9 % (ref 0–1.5)
BILIRUB SERPL-MCNC: 0.3 MG/DL (ref 0–1.2)
CREAT SERPL-MCNC: 0.71 MG/DL (ref 0.57–1)
EGFRCR SERPLBLD CKD-EPI 2021: 119.7 ML/MIN/1.73
EOSINOPHIL # BLD AUTO: 0.24 10*3/MM3 (ref 0–0.4)
EOSINOPHIL NFR BLD AUTO: 3 % (ref 0.3–6.2)
ERYTHROCYTE [DISTWIDTH] IN BLOOD BY AUTOMATED COUNT: 12.3 % (ref 12.3–15.4)
HCT VFR BLD AUTO: 50.8 % (ref 34–46.6)
HGB BLD-MCNC: 17.2 G/DL (ref 12–15.9)
IMM GRANULOCYTES # BLD AUTO: 0.07 10*3/MM3 (ref 0–0.05)
IMM GRANULOCYTES NFR BLD AUTO: 0.9 % (ref 0–0.5)
LDH SERPL L TO P-CCNC: 194 U/L (ref 135–214)
LYMPHOCYTES # BLD AUTO: 2.17 10*3/MM3 (ref 0.7–3.1)
LYMPHOCYTES NFR BLD AUTO: 27.3 % (ref 19.6–45.3)
MCH RBC QN AUTO: 32.5 PG (ref 26.6–33)
MCHC RBC AUTO-ENTMCNC: 33.9 G/DL (ref 31.5–35.7)
MCV RBC AUTO: 96 FL (ref 79–97)
MONOCYTES # BLD AUTO: 0.43 10*3/MM3 (ref 0.1–0.9)
MONOCYTES NFR BLD AUTO: 5.4 % (ref 5–12)
NEUTROPHILS # BLD AUTO: 4.96 10*3/MM3 (ref 1.7–7)
NEUTROPHILS NFR BLD AUTO: 62.5 % (ref 42.7–76)
NRBC BLD AUTO-RTO: 0 /100 WBC (ref 0–0.2)
PLATELET # BLD AUTO: 243 10*3/MM3 (ref 140–450)
RBC # BLD AUTO: 5.29 10*6/MM3 (ref 3.77–5.28)
URATE SERPL-MCNC: 5.8 MG/DL (ref 2.4–5.7)
WBC # BLD AUTO: 7.94 10*3/MM3 (ref 3.4–10.8)

## 2024-12-16 ENCOUNTER — TELEPHONE (OUTPATIENT)
Dept: OBSTETRICS AND GYNECOLOGY | Facility: CLINIC | Age: 29
End: 2024-12-16

## 2024-12-16 NOTE — TELEPHONE ENCOUNTER
Caller: Nika Wynn    Relationship:  Self    Best call back number: [phone]      PATIENT CALLED REQUESTING TO CANCEL SAME DAY APPT.    Did the patient call AFTER the start time of their scheduled appointment?  []YES  [x]NO    Was the patient's appointment rescheduled? []YES  [x]NO    Any additional information: PATIENT STATES THAT BP LEVELS HAVE BEEN OK AT HOME - DOESN'T WANT TO DRIVE 1 HR FOR APPT

## 2025-01-08 ENCOUNTER — POSTPARTUM VISIT (OUTPATIENT)
Dept: OBSTETRICS AND GYNECOLOGY | Facility: CLINIC | Age: 30
End: 2025-01-08
Payer: COMMERCIAL

## 2025-01-08 VITALS
HEIGHT: 68 IN | WEIGHT: 128 LBS | SYSTOLIC BLOOD PRESSURE: 102 MMHG | BODY MASS INDEX: 19.4 KG/M2 | DIASTOLIC BLOOD PRESSURE: 70 MMHG

## 2025-01-08 DIAGNOSIS — E03.9 HYPOTHYROIDISM (ACQUIRED): ICD-10-CM

## 2025-01-08 DIAGNOSIS — Z87.42 HISTORY OF ABNORMAL CERVICAL PAP SMEAR: ICD-10-CM

## 2025-01-08 LAB
T4 FREE SERPL-MCNC: 1.23 NG/DL (ref 0.92–1.68)
TSH SERPL DL<=0.005 MIU/L-ACNC: 1.02 UIU/ML (ref 0.27–4.2)

## 2025-01-08 NOTE — PROGRESS NOTES
Chief Complaint   Patient presents with    Postpartum Care       Postpartum Visit         Nika Wynn is a 29 y.o.  who presents today for a 6 week(s) postpartum check.     C/S: no     Vaginal, Spontaneous   Information for the patient's :  Juan Wynn [[id number]]   2022   female   Juan Wynn   4035 g (8 lb 14.3 oz)   Gestational Age: 39w4d       Baby Discharged: Discharged with Mom  Delivering Physician: Xi Watkins MD    Her pregnancy was complicated by no known issues. The patient did not have a laceration or episiotomy . Patient did have a 2CM periurethral tear that did not need repair. Patient describes vaginal bleeding as spotting. Patient is breastfeeding. She desires  natural family planning  for contraception.      She would like to discuss the following complaints today: no complaints.    Patient denies concerns for postpartum depression/anxiety. Patient denies  suicidal or homicidal ideation. Her postpartum depression screening questionnaire: 0. No treatment is indicated      Last Pap : 2023. Results: LSIL. HPV: HPV pool +.   Last Completed Pap Smear            Ordered - PAP SMEAR (Every 3 Years) Ordered on 2023  LIQUID-BASED PAP SMEAR WITH HPV GENOTYPING REGARDLESS OF INTERPRETATION ([facility])    11/15/2022  LIQUID-BASED PAP SMEAR, [facility] ([facility])    2020  Done                      The additional following portions of the patient's history were reviewed and updated as appropriate: allergies and current medications.    Review of Systems   Constitutional: Negative.    HENT: Negative.     Eyes: Negative.    Respiratory: Negative.     Cardiovascular: Negative.    Gastrointestinal: Negative.    Endocrine: Negative.    Genitourinary: Negative.    Musculoskeletal: Negative.    Skin: Negative.    Allergic/Immunologic: Negative.    Neurological: Negative.    Hematological: Negative.   "  Psychiatric/Behavioral: Negative.       All other systems reviewed and are negative.     I have reviewed and agree with the HPI, ROS, and historical information as entered above. Dahiana Landin MD      /70   Ht 172.7 cm (68\")   Wt 58.1 kg (128 lb)   LMP 02/05/2024   Breastfeeding Yes   BMI 19.46 kg/m²     Physical Exam  Vitals and nursing note reviewed. Exam conducted with a chaperone present.   Constitutional:       Appearance: She is well-developed.   HENT:      Head: Normocephalic and atraumatic.   Neck:      Thyroid: No thyroid mass or thyromegaly.   Pulmonary:      Breath sounds: No rhonchi.   Abdominal:      Palpations: Abdomen is soft. Abdomen is not rigid. There is no mass.      Tenderness: There is no abdominal tenderness. There is no guarding.      Hernia: No hernia is present.   Genitourinary:     Vagina: Normal.      Cervix: Normal.      Uterus: Normal.    Musculoskeletal:      Cervical back: Normal range of motion. No muscular tenderness.   Neurological:      Mental Status: She is alert and oriented to person, place, and time.   Psychiatric:         Behavior: Behavior normal.             Assessment and Plan    Problem List Items Addressed This Visit    None  Visit Diagnoses       Postpartum exam    -  Primary    Relevant Orders    LIQUID-BASED PAP SMEAR WITH HPV GENOTYPING REGARDLESS OF INTERPRETATION ([facility])    History of abnormal cervical Pap smear        Relevant Orders    LIQUID-BASED PAP SMEAR WITH HPV GENOTYPING REGARDLESS OF INTERPRETATION ([facility])    Hypothyroidism (acquired)        Relevant Orders    LIQUID-BASED PAP SMEAR WITH HPV GENOTYPING REGARDLESS OF INTERPRETATION ([facility])    TSH+Free T4            S/p Vaginal delivery, 6 week(s) postpartum.  Doing well.    Return to normal physical activity.  No pelvic restrictions.   Baby doing well.  Breastfeeding going well.  No si/sx of postpartum depression  Contraception: contraceptive methods: None  Return in about 1 " year (around 1/8/2026).     Dahiana Landin MD  01/08/2025

## 2025-01-10 LAB — REF LAB TEST METHOD: NORMAL

## 2025-03-28 ENCOUNTER — TELEPHONE (OUTPATIENT)
Dept: OBSTETRICS AND GYNECOLOGY | Facility: CLINIC | Age: 30
End: 2025-03-28
Payer: COMMERCIAL

## 2025-03-28 NOTE — TELEPHONE ENCOUNTER
"Pt called and stated that she had hypothyroidism during pregnancy and she was supposed to get labs done at her postpartum appt and she cannot remember if she got them done or not. Pt thinks she may have it again and states she feels \"generally miserable\" pt would like a call back to see if that's something she needs to come into this office for or if she needs to go to her pcp for it please advise   "

## 2025-03-28 NOTE — TELEPHONE ENCOUNTER
S/w patient and clarified that thyroid labs were normal at her postpartum visit. Patient encouraged to follow up with PCP if she feeling different. Patient v/u. Patient also reports dyspareunia and tenderness below clitoris when wiping since giving birth 4 months ago. Appt scheduled for patient.

## 2025-04-23 ENCOUNTER — OFFICE VISIT (OUTPATIENT)
Dept: OBSTETRICS AND GYNECOLOGY | Facility: CLINIC | Age: 30
End: 2025-04-23
Payer: COMMERCIAL

## 2025-04-23 VITALS
WEIGHT: 123 LBS | BODY MASS INDEX: 18.64 KG/M2 | SYSTOLIC BLOOD PRESSURE: 110 MMHG | DIASTOLIC BLOOD PRESSURE: 76 MMHG | HEIGHT: 68 IN

## 2025-04-23 DIAGNOSIS — N94.10 FEMALE DYSPAREUNIA: Primary | ICD-10-CM

## 2025-04-23 RX ORDER — ESTRADIOL 0.1 MG/G
2 CREAM VAGINAL DAILY
Qty: 42.5 G | Refills: 12 | Status: SHIPPED | OUTPATIENT
Start: 2025-04-23

## 2025-04-23 NOTE — PROGRESS NOTES
Chief Complaint   Patient presents with    Dyspareunia         Subjective   HPI  Nika Wynn is a 29 y.o. female, , who presents with no history of dyspareunia. She describes the pain as burning externally. Patient states that the area around clitoris and urethra is very red and tender to touch. The pain is external and occurs during intercourse with friction and with wiping. Patient states that this has just occurred since being postpartum and she has given it 5 months, but it has not improved. Patient states that lubrication does help some until it wears off. Patient states that some positions are too painful. The patient reports additional symptoms as none. .     Her last LMP was No LMP recorded..  Partner Status: Marital Status: .  New Partners since last visit: no.  Desires STD Screening: no.    Additional OB/GYN History   Last Pap :   Last Completed Pap Smear            Upcoming       PAP SMEAR (Every 3 Years) Next due on 2025  LIQUID-BASED PAP SMEAR WITH HPV GENOTYPING REGARDLESS OF INTERPRETATION ([facility])    2023  LIQUID-BASED PAP SMEAR WITH HPV GENOTYPING REGARDLESS OF INTERPRETATION ([facility])    11/15/2022  LIQUID-BASED PAP SMEAR, [facility] ([facility])    2020  Done                            Tobacco Usage?: No   OB History          2    Para   2    Term   2       0    AB   0    Living   2         SAB   0    IAB   0    Ectopic   0    Molar   0    Multiple   0    Live Births   2          Obstetric Comments   FOB #1 for pregnancy 1&2                 Current Outpatient Medications:     Prenatal Vit-Fe Fumarate-FA (PRENATAL VITAMINS PO), Take  by mouth., Disp: , Rfl:     estradiol (ESTRACE VAGINAL) 0.1 MG/GM vaginal cream, Insert 2 g into the vagina Daily., Disp: 42.5 g, Rfl: 12    levothyroxine (SYNTHROID, LEVOTHROID) 25 MCG tablet, Take 1 tablet by mouth Every Morning. (Patient not taking: Reported on 2025), Disp: 30  "tablet, Rfl: 1     Past Medical History:   Diagnosis Date    Abnormal ECG     Abnormal Pap smear of cervix 2023    Compression fracture of L2 vertebra     Connective tissue disorder     HPV (human papilloma virus) infection 2023    Mitral regurgitation     Mitral valve prolapse     Rh incompatibility 2022    Varicella 2001        Past Surgical History:   Procedure Laterality Date    COLPOSCOPY         The additional following portions of the patient's history were reviewed and updated as appropriate: allergies and current medications.    Review of Systems   Constitutional: Negative.    HENT: Negative.     Eyes: Negative.    Respiratory: Negative.     Cardiovascular: Negative.    Gastrointestinal: Negative.    Endocrine: Negative.    Genitourinary:  Positive for dyspareunia and vaginal pain.   Musculoskeletal: Negative.    Skin: Negative.    Allergic/Immunologic: Negative.    Neurological: Negative.    Hematological: Negative.    Psychiatric/Behavioral: Negative.         I have reviewed and agree with the HPI, ROS, and historical information as entered above. Dahiana Landin MD      Objective   /76   Ht 172.7 cm (68\")   Wt 55.8 kg (123 lb)   Breastfeeding Yes   BMI 18.70 kg/m²     Physical Exam  Constitutional:       Appearance: She is well-developed.   HENT:      Head: Normocephalic.   Eyes:      Conjunctiva/sclera: Conjunctivae normal.   Pulmonary:      Effort: Pulmonary effort is normal.   Genitourinary:     General: Normal vulva.          Comments: Area between clitorus and urethra raw and thin in appearance c/w atrophic changes  Psychiatric:         Behavior: Behavior normal.         Assessment & Plan       Encounter Diagnosis   Name Primary?    Female dyspareunia Yes         Plan     Discussed findings with patient.  Start vaginal estrogen and f/u in 6 weeks.  If no improvement will need bx.  Return in about 6 weeks (around 6/4/2025).      Dahiana Landin MD  04/23/2025  "

## 2025-04-30 ENCOUNTER — TELEPHONE (OUTPATIENT)
Dept: OBSTETRICS AND GYNECOLOGY | Facility: CLINIC | Age: 30
End: 2025-04-30
Payer: COMMERCIAL

## 2025-04-30 DIAGNOSIS — N94.10 FEMALE DYSPAREUNIA: Primary | ICD-10-CM

## 2025-04-30 RX ORDER — ESTRADIOL 0.1 MG/G
1 CREAM VAGINAL NIGHTLY
Qty: 42.5 G | Refills: 12 | Status: SHIPPED | OUTPATIENT
Start: 2025-04-30

## 2025-04-30 NOTE — TELEPHONE ENCOUNTER
Attempted to call pharmacy but was not able to get through. Order clarified and resent via e-prescribe. Order is for estrace vaginal cream. Insert 1 gram per vagina nightly for 2 weeks and then use 2-3 times weekly. Pt to use at least 6 weeks and will reassess at FU.

## 2025-04-30 NOTE — TELEPHONE ENCOUNTER
[facility] ([phone] [facility] pharmacy ) called in wanted to know the duration medication:    estradiol (ESTRACE VAGINAL) 0.1 MG/GM vaginal cream (04/23/2025)

## 2025-05-02 ENCOUNTER — TELEPHONE (OUTPATIENT)
Dept: OBSTETRICS AND GYNECOLOGY | Facility: CLINIC | Age: 30
End: 2025-05-02
Payer: COMMERCIAL

## 2025-05-02 NOTE — TELEPHONE ENCOUNTER
Pt called and stated that she was prescribed a estrogen cream but the packaging states to not use while breastfeeding and then she done research that stated that taking it while breastfeeding can lower the supply of breastmilk and deplete the quality of the milk, pt would like to know if she can take it or if she cannot

## 2025-05-05 ENCOUNTER — TELEPHONE (OUTPATIENT)
Dept: OBSTETRICS AND GYNECOLOGY | Facility: CLINIC | Age: 30
End: 2025-05-05
Payer: COMMERCIAL

## 2025-05-05 NOTE — TELEPHONE ENCOUNTER
(Key: [id number])  Rx #: [id number] Drug  Estradiol 0.1MG/GM cream Form  [employer] Electronic PA Original Claim Info  76 PLAN LIMITATIONS EXCEEDED(PHARMACY HELP DESK [phone]) Original Claim Info. I called the pharmacy and they have a paid claim. Outcome  Additional Information Required  Your PA has been resolved, no additional PA is required. For further inquiries please contact the number on the back of the member prescription card. (Message [phone])

## 2025-05-07 ENCOUNTER — TELEPHONE (OUTPATIENT)
Dept: OBSTETRICS AND GYNECOLOGY | Facility: CLINIC | Age: 30
End: 2025-05-07
Payer: COMMERCIAL

## 2025-05-07 NOTE — TELEPHONE ENCOUNTER
Patient calling in regard to Estradiol cream and she states her pharmacy denied it and wanted to discuss possibly having something else called in or using a different pharmacy other than [facility]

## 2025-05-07 NOTE — TELEPHONE ENCOUNTER
[facility] states they now have the PA and Rx is approved. Should be ready by today and they will text pt when ready for . Pt notified.

## 2025-06-04 ENCOUNTER — OFFICE VISIT (OUTPATIENT)
Dept: OBSTETRICS AND GYNECOLOGY | Facility: CLINIC | Age: 30
End: 2025-06-04
Payer: COMMERCIAL

## 2025-06-04 VITALS
HEIGHT: 68 IN | BODY MASS INDEX: 18.25 KG/M2 | WEIGHT: 120.4 LBS | SYSTOLIC BLOOD PRESSURE: 92 MMHG | DIASTOLIC BLOOD PRESSURE: 60 MMHG

## 2025-06-04 DIAGNOSIS — N94.10 FEMALE DYSPAREUNIA: ICD-10-CM

## 2025-06-04 DIAGNOSIS — N89.8 VAGINAL DRYNESS: ICD-10-CM

## 2025-06-04 NOTE — PROGRESS NOTES
Chief Complaint   Patient presents with    Follow-up       Subjective   HPI  Nika Wynn is a 30 y.o. female,  who presents for follow up on dyspareunia. No LMP recorded (lmp unknown). Patient is postpartum;  2024 and currently breastfeeding.    At her last visit on 25 with Dr. Landin, she was prescribed estradiol cream for assessed atrophy between clitorus and urethra. Her pharmacy denied the prescription until a PA was sent and approved for pickup . Since then she reports her symptoms/issues have improved; she no longer has pain with consistent use of the estradiol cream. She states if she stops using it for more than a couple of days, she does feel the irritation/pain return during IC. The patient reports additional symptoms as nothing. She is requesting if thyroid levels may be checked again today.        Additional OB/GYN History     Last Pap :   Last Completed Pap Smear            Upcoming       PAP SMEAR (Every 3 Years) Next due on 2025  LIQUID-BASED PAP SMEAR WITH HPV GENOTYPING REGARDLESS OF INTERPRETATION ([facility])    2023  LIQUID-BASED PAP SMEAR WITH HPV GENOTYPING REGARDLESS OF INTERPRETATION ([facility])    11/15/2022  LIQUID-BASED PAP SMEAR, [facility] ([facility])    2020  Done                            Last mammogram:   Last Completed Mammogram    This patient has no relevant Health Maintenance data.         Tobacco Usage?: No   OB History          2    Para   2    Term   2       0    AB   0    Living   2         SAB   0    IAB   0    Ectopic   0    Molar   0    Multiple   0    Live Births   2          Obstetric Comments   FOB #1 for pregnancy 1&2                 Current Outpatient Medications:     estradiol (ESTRACE VAGINAL) 0.1 MG/GM vaginal cream, Insert 1 g into the vagina Every Night. Use nightly for 2 weeks and then use 2-3 times weekly after that., Disp: 42.5 g, Rfl: 12    Prenatal Vit-Fe Fumarate-FA  "(PRENATAL VITAMINS PO), Take  by mouth., Disp: , Rfl:     levothyroxine (SYNTHROID, LEVOTHROID) 25 MCG tablet, Take 1 tablet by mouth Every Morning. (Patient not taking: Reported on 6/4/2025), Disp: 30 tablet, Rfl: 1     Past Medical History:   Diagnosis Date    Abnormal ECG     Abnormal Pap smear of cervix 2023    Compression fracture of L2 vertebra     Connective tissue disorder     HPV (human papilloma virus) infection 2023    Mitral regurgitation     Mitral valve prolapse     Rh incompatibility 2022    Varicella 2001        Past Surgical History:   Procedure Laterality Date    COLPOSCOPY         The additional following portions of the patient's history were reviewed and updated as appropriate: allergies, current medications, past family history, past medical history, past social history, past surgical history, and problem list.    Review of Systems    I have reviewed and agree with the HPI, ROS, and historical information as entered above. Dahiana Landin MD      Objective   BP 92/60 (BP Location: Right arm, Patient Position: Sitting, Cuff Size: Adult)   Ht 172.7 cm (68\")   Wt 54.6 kg (120 lb 6.4 oz)   LMP  (LMP Unknown)   Breastfeeding Yes   BMI 18.31 kg/m²     Physical Exam  Constitutional:       Appearance: She is well-developed.   HENT:      Head: Normocephalic.   Eyes:      Conjunctiva/sclera: Conjunctivae normal.   Pulmonary:      Effort: Pulmonary effort is normal.   Psychiatric:         Behavior: Behavior normal.         Assessment & Plan     Encounter Diagnoses   Name Primary?    Postpartum follow-up Yes    Vaginal dryness        Plan     Doing much better with the vaginal estrogen. Plans to continue.  Check thyroid labs   Return for Annual physical.        Dahiana Landin MD  06/04/2025   "

## 2025-06-05 LAB
T4 FREE SERPL-MCNC: 1.11 NG/DL (ref 0.92–1.68)
TSH SERPL DL<=0.005 MIU/L-ACNC: 1.74 UIU/ML (ref 0.27–4.2)